# Patient Record
Sex: MALE | Race: WHITE | NOT HISPANIC OR LATINO | URBAN - METROPOLITAN AREA
[De-identification: names, ages, dates, MRNs, and addresses within clinical notes are randomized per-mention and may not be internally consistent; named-entity substitution may affect disease eponyms.]

---

## 2018-04-30 ENCOUNTER — OUTPATIENT (OUTPATIENT)
Dept: OUTPATIENT SERVICES | Facility: HOSPITAL | Age: 78
LOS: 1 days | Discharge: HOME | End: 2018-04-30

## 2018-04-30 DIAGNOSIS — Z13.1 ENCOUNTER FOR SCREENING FOR DIABETES MELLITUS: ICD-10-CM

## 2019-10-04 ENCOUNTER — OUTPATIENT (OUTPATIENT)
Dept: OUTPATIENT SERVICES | Facility: HOSPITAL | Age: 79
LOS: 1 days | Discharge: HOME | End: 2019-10-04

## 2019-10-04 DIAGNOSIS — R73.9 HYPERGLYCEMIA, UNSPECIFIED: ICD-10-CM

## 2021-04-13 ENCOUNTER — INPATIENT (INPATIENT)
Facility: HOSPITAL | Age: 81
LOS: 12 days | Discharge: HOME HEALTH | End: 2021-04-26
Attending: SURGERY | Admitting: SURGERY
Payer: COMMERCIAL

## 2021-04-13 VITALS
RESPIRATION RATE: 16 BRPM | DIASTOLIC BLOOD PRESSURE: 53 MMHG | TEMPERATURE: 100 F | OXYGEN SATURATION: 92 % | HEART RATE: 83 BPM | SYSTOLIC BLOOD PRESSURE: 112 MMHG

## 2021-04-13 LAB
ALBUMIN SERPL ELPH-MCNC: 3.6 G/DL — SIGNIFICANT CHANGE UP (ref 3.5–5.2)
ALP SERPL-CCNC: 90 U/L — SIGNIFICANT CHANGE UP (ref 30–115)
ALT FLD-CCNC: 11 U/L — SIGNIFICANT CHANGE UP (ref 0–41)
ANION GAP SERPL CALC-SCNC: 11 MMOL/L — SIGNIFICANT CHANGE UP (ref 7–14)
APPEARANCE UR: CLEAR — SIGNIFICANT CHANGE UP
APTT BLD: 31.2 SEC — SIGNIFICANT CHANGE UP (ref 27–39.2)
AST SERPL-CCNC: 14 U/L — SIGNIFICANT CHANGE UP (ref 0–41)
BACTERIA # UR AUTO: NEGATIVE — SIGNIFICANT CHANGE UP
BASE EXCESS BLDV CALC-SCNC: 1.6 MMOL/L — SIGNIFICANT CHANGE UP (ref -2–2)
BASOPHILS # BLD AUTO: 0.01 K/UL — SIGNIFICANT CHANGE UP (ref 0–0.2)
BASOPHILS NFR BLD AUTO: 0.1 % — SIGNIFICANT CHANGE UP (ref 0–1)
BILIRUB DIRECT SERPL-MCNC: 0.2 MG/DL — SIGNIFICANT CHANGE UP (ref 0–0.2)
BILIRUB INDIRECT FLD-MCNC: 0.2 MG/DL — SIGNIFICANT CHANGE UP (ref 0.2–1.2)
BILIRUB SERPL-MCNC: 0.4 MG/DL — SIGNIFICANT CHANGE UP (ref 0.2–1.2)
BILIRUB UR-MCNC: NEGATIVE — SIGNIFICANT CHANGE UP
BLD GP AB SCN SERPL QL: SIGNIFICANT CHANGE UP
BLD GP AB SCN SERPL QL: SIGNIFICANT CHANGE UP
BUN SERPL-MCNC: 26 MG/DL — HIGH (ref 10–20)
CA-I SERPL-SCNC: 1.17 MMOL/L — SIGNIFICANT CHANGE UP (ref 1.12–1.3)
CALCIUM SERPL-MCNC: 9.2 MG/DL — SIGNIFICANT CHANGE UP (ref 8.5–10.1)
CHLORIDE SERPL-SCNC: 97 MMOL/L — LOW (ref 98–110)
CO2 SERPL-SCNC: 23 MMOL/L — SIGNIFICANT CHANGE UP (ref 17–32)
COLOR SPEC: YELLOW — SIGNIFICANT CHANGE UP
CREAT SERPL-MCNC: 1.7 MG/DL — HIGH (ref 0.7–1.5)
DIFF PNL FLD: NEGATIVE — SIGNIFICANT CHANGE UP
EOSINOPHIL # BLD AUTO: 0 K/UL — SIGNIFICANT CHANGE UP (ref 0–0.7)
EOSINOPHIL NFR BLD AUTO: 0 % — SIGNIFICANT CHANGE UP (ref 0–8)
EPI CELLS # UR: 0 /HPF — SIGNIFICANT CHANGE UP (ref 0–5)
GAS PNL BLDV: 130 MMOL/L — LOW (ref 136–145)
GAS PNL BLDV: SIGNIFICANT CHANGE UP
GLUCOSE SERPL-MCNC: 122 MG/DL — HIGH (ref 70–99)
GLUCOSE UR QL: NEGATIVE — SIGNIFICANT CHANGE UP
HCO3 BLDV-SCNC: 26 MMOL/L — SIGNIFICANT CHANGE UP (ref 22–29)
HCT VFR BLD CALC: 20.7 % — LOW (ref 42–52)
HCT VFR BLDA CALC: 19 % — LOW (ref 34–44)
HGB BLD CALC-MCNC: 6.2 G/DL — LOW (ref 14–18)
HGB BLD-MCNC: 6.6 G/DL — CRITICAL LOW (ref 14–18)
HYALINE CASTS # UR AUTO: 1 /LPF — SIGNIFICANT CHANGE UP (ref 0–7)
IMM GRANULOCYTES NFR BLD AUTO: 0.6 % — HIGH (ref 0.1–0.3)
INR BLD: 1.33 RATIO — HIGH (ref 0.65–1.3)
KETONES UR-MCNC: NEGATIVE — SIGNIFICANT CHANGE UP
LACTATE BLDV-MCNC: 1.2 MMOL/L — SIGNIFICANT CHANGE UP (ref 0.5–1.6)
LEUKOCYTE ESTERASE UR-ACNC: ABNORMAL
LIDOCAIN IGE QN: 15 U/L — SIGNIFICANT CHANGE UP (ref 7–60)
LYMPHOCYTES # BLD AUTO: 0.29 K/UL — LOW (ref 1.2–3.4)
LYMPHOCYTES # BLD AUTO: 2.3 % — LOW (ref 20.5–51.1)
MCHC RBC-ENTMCNC: 29.9 PG — SIGNIFICANT CHANGE UP (ref 27–31)
MCHC RBC-ENTMCNC: 31.9 G/DL — LOW (ref 32–37)
MCV RBC AUTO: 93.7 FL — SIGNIFICANT CHANGE UP (ref 80–94)
MONOCYTES # BLD AUTO: 1.37 K/UL — HIGH (ref 0.1–0.6)
MONOCYTES NFR BLD AUTO: 10.9 % — HIGH (ref 1.7–9.3)
NEUTROPHILS # BLD AUTO: 10.82 K/UL — HIGH (ref 1.4–6.5)
NEUTROPHILS NFR BLD AUTO: 86.1 % — HIGH (ref 42.2–75.2)
NITRITE UR-MCNC: NEGATIVE — SIGNIFICANT CHANGE UP
NRBC # BLD: 0 /100 WBCS — SIGNIFICANT CHANGE UP (ref 0–0)
NT-PROBNP SERPL-SCNC: 368 PG/ML — HIGH (ref 0–300)
PCO2 BLDV: 40 MMHG — LOW (ref 42–55)
PH BLDV: 7.42 — SIGNIFICANT CHANGE UP (ref 7.26–7.43)
PH UR: 6 — SIGNIFICANT CHANGE UP (ref 5–8)
PLATELET # BLD AUTO: 297 K/UL — SIGNIFICANT CHANGE UP (ref 130–400)
PO2 BLDV: 25 MMHG — SIGNIFICANT CHANGE UP (ref 20–40)
POTASSIUM BLDV-SCNC: 3.8 MMOL/L — SIGNIFICANT CHANGE UP (ref 3.3–5.6)
POTASSIUM SERPL-MCNC: 4.3 MMOL/L — SIGNIFICANT CHANGE UP (ref 3.5–5)
POTASSIUM SERPL-SCNC: 4.3 MMOL/L — SIGNIFICANT CHANGE UP (ref 3.5–5)
PROT SERPL-MCNC: 5.7 G/DL — LOW (ref 6–8)
PROT UR-MCNC: SIGNIFICANT CHANGE UP
PROTHROM AB SERPL-ACNC: 15.3 SEC — HIGH (ref 9.95–12.87)
RBC # BLD: 2.21 M/UL — LOW (ref 4.7–6.1)
RBC # FLD: 20.3 % — HIGH (ref 11.5–14.5)
RBC CASTS # UR COMP ASSIST: 1 /HPF — SIGNIFICANT CHANGE UP (ref 0–4)
SAO2 % BLDV: 39 % — SIGNIFICANT CHANGE UP
SARS-COV-2 RNA SPEC QL NAA+PROBE: SIGNIFICANT CHANGE UP
SODIUM SERPL-SCNC: 131 MMOL/L — LOW (ref 135–146)
SP GR SPEC: 1.03 — HIGH (ref 1.01–1.03)
TROPONIN T SERPL-MCNC: <0.01 NG/ML — SIGNIFICANT CHANGE UP
UROBILINOGEN FLD QL: SIGNIFICANT CHANGE UP
WBC # BLD: 12.56 K/UL — HIGH (ref 4.8–10.8)
WBC # FLD AUTO: 12.56 K/UL — HIGH (ref 4.8–10.8)
WBC UR QL: 55 /HPF — HIGH (ref 0–5)

## 2021-04-13 PROCEDURE — 93010 ELECTROCARDIOGRAM REPORT: CPT

## 2021-04-13 PROCEDURE — 99285 EMERGENCY DEPT VISIT HI MDM: CPT | Mod: CS

## 2021-04-13 PROCEDURE — 74176 CT ABD & PELVIS W/O CONTRAST: CPT | Mod: 26,59,MH

## 2021-04-13 PROCEDURE — 71045 X-RAY EXAM CHEST 1 VIEW: CPT | Mod: 26,77

## 2021-04-13 PROCEDURE — 71045 X-RAY EXAM CHEST 1 VIEW: CPT | Mod: 26

## 2021-04-13 PROCEDURE — 74177 CT ABD & PELVIS W/CONTRAST: CPT | Mod: 26,MH

## 2021-04-13 RX ORDER — IOHEXOL 300 MG/ML
30 INJECTION, SOLUTION INTRAVENOUS ONCE
Refills: 0 | Status: COMPLETED | OUTPATIENT
Start: 2021-04-13 | End: 2021-04-13

## 2021-04-13 RX ORDER — SODIUM CHLORIDE 9 MG/ML
1000 INJECTION, SOLUTION INTRAVENOUS ONCE
Refills: 0 | Status: COMPLETED | OUTPATIENT
Start: 2021-04-13 | End: 2021-04-13

## 2021-04-13 RX ORDER — PANTOPRAZOLE SODIUM 20 MG/1
80 TABLET, DELAYED RELEASE ORAL ONCE
Refills: 0 | Status: COMPLETED | OUTPATIENT
Start: 2021-04-13 | End: 2021-04-13

## 2021-04-13 RX ORDER — PANTOPRAZOLE SODIUM 20 MG/1
8 TABLET, DELAYED RELEASE ORAL
Qty: 80 | Refills: 0 | Status: DISCONTINUED | OUTPATIENT
Start: 2021-04-13 | End: 2021-04-21

## 2021-04-13 RX ORDER — PIPERACILLIN AND TAZOBACTAM 4; .5 G/20ML; G/20ML
3.38 INJECTION, POWDER, LYOPHILIZED, FOR SOLUTION INTRAVENOUS ONCE
Refills: 0 | Status: COMPLETED | OUTPATIENT
Start: 2021-04-13 | End: 2021-04-13

## 2021-04-13 RX ORDER — SODIUM CHLORIDE 9 MG/ML
1000 INJECTION INTRAMUSCULAR; INTRAVENOUS; SUBCUTANEOUS
Refills: 0 | Status: DISCONTINUED | OUTPATIENT
Start: 2021-04-13 | End: 2021-04-14

## 2021-04-13 RX ORDER — ACETAMINOPHEN 500 MG
650 TABLET ORAL ONCE
Refills: 0 | Status: COMPLETED | OUTPATIENT
Start: 2021-04-13 | End: 2021-04-13

## 2021-04-13 RX ADMIN — SODIUM CHLORIDE 125 MILLILITER(S): 9 INJECTION INTRAMUSCULAR; INTRAVENOUS; SUBCUTANEOUS at 18:30

## 2021-04-13 RX ADMIN — SODIUM CHLORIDE 125 MILLILITER(S): 9 INJECTION INTRAMUSCULAR; INTRAVENOUS; SUBCUTANEOUS at 20:05

## 2021-04-13 RX ADMIN — SODIUM CHLORIDE 1000 MILLILITER(S): 9 INJECTION, SOLUTION INTRAVENOUS at 20:05

## 2021-04-13 RX ADMIN — Medication 650 MILLIGRAM(S): at 16:42

## 2021-04-13 RX ADMIN — PANTOPRAZOLE SODIUM 10 MG/HR: 20 TABLET, DELAYED RELEASE ORAL at 20:04

## 2021-04-13 RX ADMIN — PIPERACILLIN AND TAZOBACTAM 200 GRAM(S): 4; .5 INJECTION, POWDER, LYOPHILIZED, FOR SOLUTION INTRAVENOUS at 23:58

## 2021-04-13 RX ADMIN — PANTOPRAZOLE SODIUM 80 MILLIGRAM(S): 20 TABLET, DELAYED RELEASE ORAL at 20:04

## 2021-04-13 RX ADMIN — IOHEXOL 30 MILLILITER(S): 300 INJECTION, SOLUTION INTRAVENOUS at 21:29

## 2021-04-13 NOTE — ED PROVIDER NOTE - ATTENDING CONTRIBUTION TO CARE
79 y/o male with h/o cad s/p cabg, ckd, htn, hld, gerd, in ER with c/o abd pain x 2 days.  Pain to mid/R abdomen, no n/v.  no diarrhea.  Pt was told by his doctor that his blood counts have been going down (hgb was 10 10/2020, was 8.9 ~ 2 weeks ago), was started on iron 1 week ago, states stool are dark brown since starting iron, but denies any black or bloody stool.  no cp.  + harris and generalized weakness.  went to an [facility] today, had temp there and + guaiac.    PE - nad, nc/at, eomi, perrl, op - clear, mmm, cta b/l, no w/r/r, rrr, abd - soft, + diffuse tenderness, no guarding/rebound, nabs, rectal - brown stool, no blood, from x 4, A&O x 3, no focal neuro deficits.  -ivf, check labs, ct abd.

## 2021-04-13 NOTE — ED ADULT NURSE NOTE - CHIEF COMPLAINT QUOTE
Pt BIBA from urgent care complaining of abdominal pain x 1 week. sent in for blood on rectal exam at urgent care.

## 2021-04-13 NOTE — ED ADULT NURSE NOTE - ED STAT RN HANDOFF DETAILS
report given to kate de at this time ivf and iv protonix running ngt to [facility] cardiac monitoring maintained. will cont to monitor and assess.

## 2021-04-13 NOTE — ED ADULT NURSE NOTE - NSIMPLEMENTINTERV_GEN_ALL_ED
Implemented All Fall with Harm Risk Interventions:  [place] to call system. Call bell, personal items and telephone within reach. Instruct patient to call for assistance. Room bathroom lighting operational. Non-slip footwear when patient is off stretcher. Physically safe environment: no spills, clutter or unnecessary equipment. Stretcher in lowest position, wheels locked, appropriate side rails in place. Provide visual cue, wrist band, yellow gown, etc. Monitor gait and stability. Monitor for mental status changes and reorient to person, place, and time. Review medications for side effects contributing to fall risk. Reinforce activity limits and safety measures with patient and family. Provide visual clues: red socks.

## 2021-04-13 NOTE — ED PROVIDER NOTE - OBJECTIVE STATEMENT
80M with past medical history of CABGx4, HTN, HLD, CKD3, GERD presents with R sided abdominal pain, scant BRBPR generalized weakness, including inability to climb stairs, sent in by [facility] for +FOBT. PT denies fever, chills, chest pain, shortness of breath, nausea, vomiting, diarrhea, dysuria. 80M with past medical history of CABGx4, HTN, HLD, CKD3, GERD presents with R sided abdominal pain, scant BRBPR generalized weakness, including inability to climb stairs, sent in by [facility] for +FOBT. PT denies fever, chills, chest pain, shortness of breath, nausea, vomiting, diarrhea, dysuria. Most recent Hgb 8.9 on 3/29, per son, at which point pt started on iron pills.

## 2021-04-13 NOTE — ED PROVIDER NOTE - PROGRESS NOTE DETAILS
SC: ulceration with possible perforation on CT, surgery consulted. hgb 6.6 - prbc transfusion ordered.  ct abd - "Wall thickening and wall edema of the distal stomach and proximal duodenum with mucosal hyperenhancement and surrounding inflammatory change compatible with inflamed ulcer disease. An additional focus of air along the lesser curvature is slightly distant from the suspected main ulcer and may reflect additional ulcer disease, however contained or impending perforation is not excluded and close monitoring is suggested. No evidence for gross free air at this time."    Pt started on protonix drip.  surgery to see pt. GW: Repeat CT found contained perforation. admit to SICU for acute h/h drop and perforation pt given ivf, iv abx, on protonix drip, prbc transfusion, seen by surgery, NGT placed by surgical team,  repeat CT c/w contained perforation.  pt admitted to SICU. GW: Repeat CT found contained perforation. admit to SICU for acute h/h drop and contained perforation

## 2021-04-13 NOTE — ED PROVIDER NOTE - PHYSICAL EXAMINATION
CONSTITUTIONAL: in no acute distress.   SKIN: warm, dry  HEAD: Normocephalic.  EYES: PERRL.  ENT: Airway clear.  NECK: Supple.  LYMPH: No acute cervical adenopathy.  CARD: Regular rate and rhythm.   RESP: No wheezing, rales or rhonchi.  ABD: soft, TTP R abdomen upper and lower quadrants, no peritoneal signs, no CVA tenderness.  EXT: No clubbing, cyanosis.   NEURO: Alert, oriented.  PSYCH: Cooperative, appropriate. CONSTITUTIONAL: in no acute distress.   SKIN: warm, dry  HEAD: Normocephalic.  EYES: PERRL.  ENT: Airway clear.  NECK: Supple.  LYMPH: No acute cervical adenopathy.  CARD: Regular rate and rhythm.   RESP: No wheezing, rales or rhonchi.  ABD: soft, TTP R abdomen upper and lower quadrants, no peritoneal signs, no CVA tenderness.  EXT: No clubbing, cyanosis.   NEURO: Alert, oriented.  PSYCH: Cooperative, appropriate.  NESTOR: negative for gross blood or masses

## 2021-04-14 LAB
ANION GAP SERPL CALC-SCNC: 10 MMOL/L — SIGNIFICANT CHANGE UP (ref 7–14)
APTT BLD: 30.3 SEC — SIGNIFICANT CHANGE UP (ref 27–39.2)
BASOPHILS # BLD AUTO: 0.01 K/UL — SIGNIFICANT CHANGE UP (ref 0–0.2)
BASOPHILS NFR BLD AUTO: 0.1 % — SIGNIFICANT CHANGE UP (ref 0–1)
BUN SERPL-MCNC: 22 MG/DL — HIGH (ref 10–20)
CALCIUM SERPL-MCNC: 8.3 MG/DL — LOW (ref 8.5–10.1)
CHLORIDE SERPL-SCNC: 106 MMOL/L — SIGNIFICANT CHANGE UP (ref 98–110)
CK MB CFR SERPL CALC: 3.6 NG/ML — SIGNIFICANT CHANGE UP (ref 0.6–6.3)
CK MB CFR SERPL CALC: 4 NG/ML — SIGNIFICANT CHANGE UP (ref 0.6–6.3)
CK SERPL-CCNC: 141 U/L — SIGNIFICANT CHANGE UP (ref 0–225)
CO2 SERPL-SCNC: 21 MMOL/L — SIGNIFICANT CHANGE UP (ref 17–32)
CREAT SERPL-MCNC: 1.4 MG/DL — SIGNIFICANT CHANGE UP (ref 0.7–1.5)
EOSINOPHIL # BLD AUTO: 0.02 K/UL — SIGNIFICANT CHANGE UP (ref 0–0.7)
EOSINOPHIL NFR BLD AUTO: 0.2 % — SIGNIFICANT CHANGE UP (ref 0–8)
GLUCOSE SERPL-MCNC: 95 MG/DL — SIGNIFICANT CHANGE UP (ref 70–99)
HCT VFR BLD CALC: 24.7 % — LOW (ref 42–52)
HGB BLD-MCNC: 7.9 G/DL — LOW (ref 14–18)
IMM GRANULOCYTES NFR BLD AUTO: 0.5 % — HIGH (ref 0.1–0.3)
INR BLD: 1.23 RATIO — SIGNIFICANT CHANGE UP (ref 0.65–1.3)
LACTATE SERPL-SCNC: 1.3 MMOL/L — SIGNIFICANT CHANGE UP (ref 0.7–2)
LYMPHOCYTES # BLD AUTO: 0.3 K/UL — LOW (ref 1.2–3.4)
LYMPHOCYTES # BLD AUTO: 3.2 % — LOW (ref 20.5–51.1)
MAGNESIUM SERPL-MCNC: 1.8 MG/DL — SIGNIFICANT CHANGE UP (ref 1.8–2.4)
MCHC RBC-ENTMCNC: 29.5 PG — SIGNIFICANT CHANGE UP (ref 27–31)
MCHC RBC-ENTMCNC: 32 G/DL — SIGNIFICANT CHANGE UP (ref 32–37)
MCV RBC AUTO: 92.2 FL — SIGNIFICANT CHANGE UP (ref 80–94)
MONOCYTES # BLD AUTO: 1.02 K/UL — HIGH (ref 0.1–0.6)
MONOCYTES NFR BLD AUTO: 11 % — HIGH (ref 1.7–9.3)
NEUTROPHILS # BLD AUTO: 7.88 K/UL — HIGH (ref 1.4–6.5)
NEUTROPHILS NFR BLD AUTO: 85 % — HIGH (ref 42.2–75.2)
NRBC # BLD: 0 /100 WBCS — SIGNIFICANT CHANGE UP (ref 0–0)
PHOSPHATE SERPL-MCNC: 3.1 MG/DL — SIGNIFICANT CHANGE UP (ref 2.1–4.9)
PLATELET # BLD AUTO: 222 K/UL — SIGNIFICANT CHANGE UP (ref 130–400)
POTASSIUM SERPL-MCNC: 4.5 MMOL/L — SIGNIFICANT CHANGE UP (ref 3.5–5)
POTASSIUM SERPL-SCNC: 4.5 MMOL/L — SIGNIFICANT CHANGE UP (ref 3.5–5)
PROTHROM AB SERPL-ACNC: 14.1 SEC — HIGH (ref 9.95–12.87)
RBC # BLD: 2.68 M/UL — LOW (ref 4.7–6.1)
RBC # FLD: 19.4 % — HIGH (ref 11.5–14.5)
SODIUM SERPL-SCNC: 137 MMOL/L — SIGNIFICANT CHANGE UP (ref 135–146)
TROPONIN T SERPL-MCNC: <0.01 NG/ML — SIGNIFICANT CHANGE UP
TROPONIN T SERPL-MCNC: <0.01 NG/ML — SIGNIFICANT CHANGE UP
WBC # BLD: 9.28 K/UL — SIGNIFICANT CHANGE UP (ref 4.8–10.8)
WBC # FLD AUTO: 9.28 K/UL — SIGNIFICANT CHANGE UP (ref 4.8–10.8)

## 2021-04-14 PROCEDURE — 99291 CRITICAL CARE FIRST HOUR: CPT

## 2021-04-14 RX ORDER — METOPROLOL TARTRATE 50 MG
1 TABLET ORAL
Qty: 0 | Refills: 0 | DISCHARGE

## 2021-04-14 RX ORDER — METOPROLOL TARTRATE 50 MG
5 TABLET ORAL EVERY 6 HOURS
Refills: 0 | Status: DISCONTINUED | OUTPATIENT
Start: 2021-04-14 | End: 2021-04-14

## 2021-04-14 RX ORDER — METRONIDAZOLE 500 MG
TABLET ORAL
Refills: 0 | Status: DISCONTINUED | OUTPATIENT
Start: 2021-04-14 | End: 2021-04-17

## 2021-04-14 RX ORDER — CHLORHEXIDINE GLUCONATE 213 G/1000ML
1 SOLUTION TOPICAL
Refills: 0 | Status: DISCONTINUED | OUTPATIENT
Start: 2021-04-14 | End: 2021-04-26

## 2021-04-14 RX ORDER — MAGNESIUM SULFATE 500 MG/ML
1 VIAL (ML) INJECTION ONCE
Refills: 0 | Status: COMPLETED | OUTPATIENT
Start: 2021-04-14 | End: 2021-04-14

## 2021-04-14 RX ORDER — METOPROLOL TARTRATE 50 MG
5 TABLET ORAL EVERY 6 HOURS
Refills: 0 | Status: DISCONTINUED | OUTPATIENT
Start: 2021-04-14 | End: 2021-04-23

## 2021-04-14 RX ORDER — FLUCONAZOLE 150 MG/1
400 TABLET ORAL EVERY 24 HOURS
Refills: 0 | Status: DISCONTINUED | OUTPATIENT
Start: 2021-04-14 | End: 2021-04-14

## 2021-04-14 RX ORDER — METRONIDAZOLE 500 MG
500 TABLET ORAL EVERY 8 HOURS
Refills: 0 | Status: DISCONTINUED | OUTPATIENT
Start: 2021-04-14 | End: 2021-04-17

## 2021-04-14 RX ORDER — ATORVASTATIN CALCIUM 80 MG/1
80 TABLET, FILM COATED ORAL AT BEDTIME
Refills: 0 | Status: DISCONTINUED | OUTPATIENT
Start: 2021-04-14 | End: 2021-04-14

## 2021-04-14 RX ORDER — FLUCONAZOLE 150 MG/1
800 TABLET ORAL ONCE
Refills: 0 | Status: DISCONTINUED | OUTPATIENT
Start: 2021-04-14 | End: 2021-04-14

## 2021-04-14 RX ORDER — ASPIRIN/CALCIUM CARB/MAGNESIUM 324 MG
1 TABLET ORAL
Qty: 0 | Refills: 0 | DISCHARGE

## 2021-04-14 RX ORDER — BENZOCAINE 10 %
1 GEL (GRAM) MUCOUS MEMBRANE
Refills: 0 | Status: DISCONTINUED | OUTPATIENT
Start: 2021-04-14 | End: 2021-04-26

## 2021-04-14 RX ORDER — OMEPRAZOLE 10 MG/1
1 CAPSULE, DELAYED RELEASE ORAL
Qty: 0 | Refills: 0 | DISCHARGE

## 2021-04-14 RX ORDER — SODIUM CHLORIDE 9 MG/ML
1000 INJECTION INTRAMUSCULAR; INTRAVENOUS; SUBCUTANEOUS
Refills: 0 | Status: DISCONTINUED | OUTPATIENT
Start: 2021-04-14 | End: 2021-04-15

## 2021-04-14 RX ORDER — RANOLAZINE 500 MG/1
1 TABLET, FILM COATED, EXTENDED RELEASE ORAL
Qty: 0 | Refills: 0 | DISCHARGE

## 2021-04-14 RX ORDER — METOPROLOL TARTRATE 50 MG
50 TABLET ORAL
Refills: 0 | Status: DISCONTINUED | OUTPATIENT
Start: 2021-04-14 | End: 2021-04-14

## 2021-04-14 RX ORDER — FLUCONAZOLE 150 MG/1
200 TABLET ORAL EVERY 24 HOURS
Refills: 0 | Status: DISCONTINUED | OUTPATIENT
Start: 2021-04-14 | End: 2021-04-17

## 2021-04-14 RX ORDER — CHOLECALCIFEROL (VITAMIN D3) 125 MCG
1 CAPSULE ORAL
Qty: 0 | Refills: 0 | DISCHARGE

## 2021-04-14 RX ORDER — ATORVASTATIN CALCIUM 80 MG/1
1 TABLET, FILM COATED ORAL
Qty: 0 | Refills: 0 | DISCHARGE

## 2021-04-14 RX ORDER — IRON POLYSACCHARIDE COMPLEX 150 MG
1 CAPSULE ORAL
Qty: 0 | Refills: 0 | DISCHARGE

## 2021-04-14 RX ORDER — CEFEPIME 1 G/1
1000 INJECTION, POWDER, FOR SOLUTION INTRAMUSCULAR; INTRAVENOUS EVERY 12 HOURS
Refills: 0 | Status: DISCONTINUED | OUTPATIENT
Start: 2021-04-14 | End: 2021-04-17

## 2021-04-14 RX ORDER — METOPROLOL TARTRATE 50 MG
25 TABLET ORAL EVERY 12 HOURS
Refills: 0 | Status: DISCONTINUED | OUTPATIENT
Start: 2021-04-14 | End: 2021-04-14

## 2021-04-14 RX ORDER — ASPIRIN/CALCIUM CARB/MAGNESIUM 324 MG
81 TABLET ORAL DAILY
Refills: 0 | Status: DISCONTINUED | OUTPATIENT
Start: 2021-04-14 | End: 2021-04-14

## 2021-04-14 RX ORDER — METRONIDAZOLE 500 MG
500 TABLET ORAL ONCE
Refills: 0 | Status: COMPLETED | OUTPATIENT
Start: 2021-04-14 | End: 2021-04-14

## 2021-04-14 RX ADMIN — Medication 100 MILLIGRAM(S): at 21:16

## 2021-04-14 RX ADMIN — Medication 5 MILLIGRAM(S): at 18:11

## 2021-04-14 RX ADMIN — Medication 5 MILLIGRAM(S): at 06:03

## 2021-04-14 RX ADMIN — FLUCONAZOLE 100 MILLIGRAM(S): 150 TABLET ORAL at 06:03

## 2021-04-14 RX ADMIN — Medication 100 MILLIGRAM(S): at 02:59

## 2021-04-14 RX ADMIN — Medication 100 MILLIGRAM(S): at 13:16

## 2021-04-14 RX ADMIN — CEFEPIME 100 MILLIGRAM(S): 1 INJECTION, POWDER, FOR SOLUTION INTRAMUSCULAR; INTRAVENOUS at 06:02

## 2021-04-14 RX ADMIN — SODIUM CHLORIDE 100 MILLILITER(S): 9 INJECTION INTRAMUSCULAR; INTRAVENOUS; SUBCUTANEOUS at 02:59

## 2021-04-14 RX ADMIN — CEFEPIME 100 MILLIGRAM(S): 1 INJECTION, POWDER, FOR SOLUTION INTRAMUSCULAR; INTRAVENOUS at 17:33

## 2021-04-14 RX ADMIN — Medication 25 MILLIGRAM(S): at 09:17

## 2021-04-14 RX ADMIN — Medication 50 GRAM(S): at 11:57

## 2021-04-14 NOTE — H&P ADULT - NSHPLABSRESULTS_GEN_ALL_CORE
LAB/STUDIES:                        6.6    12.56 )-----------( 297      ( 2021 15:56 )             20.7     04-13    131<L>  |  97<L>  |  26<H>  ----------------------------<  122<H>  4.3   |  23  |  1.7<H>    Ca    9.2      2021 15:56    TPro  5.7<L>  /  Alb  3.6  /  TBili  0.4  /  DBili  0.2  /  AST  14  /  ALT  11  /  AlkPhos  90  04-13    PT/INR - ( 2021 15:56 )   PT: 15.30 sec;   INR: 1.33 ratio         PTT - ( 2021 15:56 )  PTT:31.2 sec  LIVER FUNCTIONS - ( 2021 15:56 )  Alb: 3.6 g/dL / Pro: 5.7 g/dL / ALK PHOS: 90 U/L / ALT: 11 U/L / AST: 14 U/L / GGT: x           Urinalysis Basic - ( 2021 20:16 )    Color: Yellow / Appearance: Clear / S.033 / pH: x  Gluc: x / Ketone: Negative  / Bili: Negative / Urobili: <2 mg/dL   Blood: x / Protein: Trace / Nitrite: Negative   Leuk Esterase: Large / RBC: 1 /HPF / WBC 55 /HPF   Sq Epi: x / Non Sq Epi: 0 /HPF / Bacteria: Negative      CARDIAC MARKERS ( 2021 15:56 )  x     / <0.01 ng/mL / x     / x     / x                  IMAGING:  < from: CT Abdomen and Pelvis w/ IV Cont (21 @ 17:57) >      Wall thickening and wall edema of the distal stomach and proximal duodenum with mucosal hyperenhancement and surrounding inflammatory change compatible with inflamed ulcer disease. An additional focus of air along the lesser curvature is slightly distant from the suspected main ulcer and may reflect additional ulcer disease, however contained or impending perforation is not excluded and close monitoring is suggested. No evidence for gross free air at this time.    A 6.4 cm left renal exophytic lesion is not characterized on this study. Recommend nonemergent renal protocol CT for further evaluation.    < end of copied text >    < from: CT Abdomen and Pelvis w/ Oral Cont (21 @ 22:09) >    Thickening of the gastric wall in the region of the greater curvature as well as the region of the antrum, and thickening of the duodenal bulb and second portion of the duodenum are again noted. These findings are associated with stranding in the adjacent fat. Findings are consistent with gastritis and duodenitis. Despite the degree of thickening of the gastric and duodenal wall, the oral contrast material has passed into the proximal small bowel.    Previously noted small gas bubbles are again seen posterior to the posterior wall of the gastric antrum (3/25-26). Findings are consistent with a contained perforated ulcer into the region of the lesser sac with inflammatory changes but without a discrete fluid collection. There is no evidence of active extravasation of the oral contrast material. There is no evidence of pneumoperitoneum or free air elsewhere within the abdomen.    < end of copied text >

## 2021-04-14 NOTE — CONSULT NOTE ADULT - SUBJECTIVE AND OBJECTIVE BOX
SICU Consultation Note  ======================================================================================================  KELLY DELGADO  MRN-[id number]    80M w/ PMH of CKD 3 (baseline Cr ~1.7), HTN, HLD< GERD, known L kidney mass, pacemaker (non-functioning per pt) and CAD s/p 4-vessel CABG ([facility], ) who presented to the ED with 2 weeks of worsening abdominal pain. Pt reports abdominal pain was right sided. Over the past week, he developed worsening dyspnea. At baseline, pt walks on treadmill 5x a week. Pt denies history of prior EGD or cscope. Pt was noted to be anemic to 8.9 outpatient with subsequent FOBT positive. He was supposed to FU with GI, but never did.     In the ED, Hb 6.6, WBC 12. CT A/P notable for wall thickening and wall edema of the distal stomach and proximal duodenum with mucosal hyperenhancement and surrounding inflammatory change compatible with inflamed ulcer disease, as well as a focus of air along the lesser curvature slightly distant from the suspected main ulcer possibly reflecting additional ulcer disease, however contained or impending perforation is not excluded and close monitoring is suggested. No gross free air noted. NGT was placed to 50cm. PO contrast given, and repeat CT showed redemonstration of gastric wall thickening, thickening of the duodenal bulb and 2nd portion of the duodenum with associated fat stranding c/w gastritis and duodenitis. PO contrast was visible in the proximal small bowel. Previously noted small gas bubbles are again seen posterior to the posterior wall of the gastric antrum. Findings are consistent with a contained perforated ulcer into the region of the lesser sac with inflammatory changes but without a discrete fluid collection. There is no evidence of active extravasation of the oral contrast material. There is no evidence of pneumoperitoneum or free air elsewhere within the abdomen.     SICU is consulted for hemodynamic monitoring i/s/o contained gastric ulcer perforation.     PMH  CAD (coronary artery disease)  GERD (gastroesophageal reflux disease)    Home Meds:  aspirin 81  omeprazole (non-compliant)  atorvastatin  metoprolol    Allergies  No Known Allergies    Current Medications:  pantoprazole Infusion 8 mG/Hr (10 mL/Hr) IV Continuous <Continuous>  sodium chloride 0.9%. 1000 milliLiter(s) (125 mL/Hr) IV Continuous <Continuous>    Advanced Directives: Presumed Full Code    VITAL SIGNS, INS/OUTS (Last 24hours):    ICU Vital Signs Last 24 Hrs  T(C): 36.7 (2021 21:25), Max: 37.5 (2021 14:44)  T(F): 98 (2021 21:25), Max: 99.5 (2021 14:44)  HR: 77 (:) (72 - 90)  BP: 119/60 (:25) (112/53 - 145/45)  RR: 16 (2021 14:44) (16 - 16)  SpO2: 92% (2021 14:44) (92% - 92%)    Physical Exam:   ---------------------------------------------------------------------------------------  GCS:      Exam: A&Ox3, no focal deficits    RESPIRATORY:  Normal expansion/effort  Mechanical Ventilation:     CARDIOVASCULAR:  S1/S2.  RRR  No peripheral edema    GASTROINTESTINAL:  Abdomen soft, non-tender, non-distended    MUSCULOSKELETAL:  Extremities warm, pink, well-perfused.    DERM:  No skin breakdown     :   Exam: Taveras catheter in place.       Tubes/Lines/Drains  ----------------------------------------------------------------------------------------------------------  [x] Peripheral IV  [x] NGT    LABS  --------------------------------------------------------------------------------------               6.6    12.56 )-----------( 297      ( 2021 15:56 )             20.7       Auto Neutrophil %: 86.1 % (21 @ 15:56)  Auto Immature Granulocyte %: 0.6 % (21 @ 15:56)        131<L>  |  97<L>  |  26<H>  ----------------------------<  122<H>  4.3   |  23  |  1.7<H>    Calcium, Total Serum: 9.2 mg/dL (21 @ 15:56)    LFTs:             5.7  | 0.4  | 14       ------------------[90      ( 2021 15:56 )  3.6  | 0.2  | 11          Lipase:15       Blood Gas Venous - Lactate: 1.2 mmoL/L (21 @ 15:58)    Coags:     15.30  ----< 1.33    ( 2021 15:56 )     31.2      CARDIAC MARKERS ( 2021 15:56 )  x     / <0.01 ng/mL / x     / x     / x        Serum Pro-Brain Natriuretic Peptide: 368 pg/mL (21 @ 15:56)    Urinalysis Basic - ( 2021 20:16 )    Color: Yellow / Appearance: Clear / S.033 / pH: x  Gluc: x / Ketone: Negative  / Bili: Negative / Urobili: <2 mg/dL   Blood: x / Protein: Trace / Nitrite: Negative   Leuk Esterase: Large / RBC: 1 /HPF / WBC 55 /HPF   Sq Epi: x / Non Sq Epi: 0 /HPF / Bacteria: Negative    CT/XRAY/ECHO/TCD/EEG  ----------------------------------------------------------------------------------------------  < from: CT Abdomen and Pelvis w/ IV Cont (21 @ 17:57) >    FINDINGS:  Motion degraded examination.    LOWER CHEST: Bibasilar subsegmental atelectasis/scarring. Partially imaged sternotomy, pacer wire and coronary artery calcifications.    LIVER: Scattered hepatic cysts and hypodensities too small to characterize.    PANCREAS: Unremarkable. Inflammation in the region the pancreatic head is favored to be secondary to the stomach    GALLBLADDER AND BILIARY TREE: Unremarkable CT appearance of the gallbladder. No biliary ductal dilatation.    KIDNEYS: Symmetric pattern of renal enhancement. No hydronephrosis. Bilateral renal cysts. A 6.4 cm left renal exophytic hypodensity measures higher attenuation than expected for a simple cyst and has a questionable perceptible rim.    VASCULATURE: Normal caliber abdominal aorta with atherosclerotic changes.    BOWEL: No bowel obstruction. Underdistention limits evaluation for colonic wall thickening. Normal appendix. Mild wall thickening of the distal esophagus. There is marked wall thickening and wall edema of the distal stomach and proximal duodenum with mucosal hyperenhancement and focal outpouching compatible with inflamed ulcer disease. A focus of air along the lesser curvature (series 4, image 93) is slightly distal from the suspected main ulcer.    PERITONEUM/RETROPERITONEUM/MESENTERY: No ascites or gross pneumoperitoneum    PELVIC VISCERA: Underdistention limits evaluation of the urinary bladder. Enlarged prostate gland. Bilateral hydroceles.    IMPRESSION:  ·	Wall thickening and wall edema of the distal stomach and proximal duodenum with mucosal hyperenhancement and surrounding inflammatory change compatible with inflamed ulcer disease. An additional focus of air along the lesser curvature is slightly distant from the suspected main ulcer and may reflect additional ulcer disease, however contained or impending perforation is not excluded and close monitoring is suggested. No evidence for gross free air at this time.  ·	A 6.4 cm left renal exophytic lesion is not characterized on this study. Recommend nonemergent renal protocol CT for further evaluation.      < from: CT Abdomen and Pelvis w/ Oral Cont (21 @ 22:09) >    FINDINGS:  TUBES/LINES: Pacemaker lead is noted. An NG tube is in place extending to just distal to the EG junction.    PANCREAS: The pancreas is normal in size and configuration. No evidence of mass or pancreatitis. As described below, inflammatory changes are noted in the region of the lesser sac.    PELVIC ORGANS: Prostatic enlargement again noted. Contrast is present within the urinary bladderfrom the previous study. No evidence of pelvic lymphadenopathy or fluid collection.    PERITONEUM/MESENTERY/BOWEL: Thickening of the gastric wall in the region of the greater curvature as well as the region of the antrum, and thickening of the duodenal bulb and second portion of the duodenum are again noted. These findings are associated with stranding in the adjacent fat. Findings are consistent with gastritis and duodenitis. Despite the degree of thickening of the gastric and duodenal wall, theoral contrast material is passed into the proximal small bowel.    Previously noted small gas bubbles are again seen posterior to the posterior wall of the gastric antrum. Findings are consistent with a contained perforated ulcer into the region of the lesser sac with inflammatory changes but without a discrete fluid collection. There is no evidence of pneumoperitoneum or free air elsewhere within the abdomen.    No evidence of ascites within the abdomen or pelvis. The appendix is normal in appearance.    IMPRESSION:  ·	Thickening of the gastric wall in the region of the greater curvature as well as the region of the antrum, and thickening of the duodenal bulb and second portion of the duodenum are again noted. These findings are associated with stranding in the adjacent fat. Findings are consistent with gastritis and duodenitis. Despite the degree of thickening of the gastric and duodenal wall, the oral contrast material has passed into the proximal small bowel.  ·	Previously noted small gas bubbles are again seen posterior to the posterior wall of the gastric antrum (3/25-26). Findings are consistent with a contained perforated ulcer into the region of the lesser sac with inflammatory changes but without a discrete fluid collection. There is no evidence of active extravasation of the oral contrast material. There is no evidence of pneumoperitoneum or free air elsewhere within the abdomen.  --------------------------------------------------------------------------------------  Admit Diagnosis:       SICU Consultation Note  ======================================================================================================  KELLY DELGADO  MRN-[id number]    80M w/ PMH of CKD 3 (baseline Cr ~1.7), HTN, HLD, GERD, known L kidney mass, pacemaker (non-functioning per pt) and CAD s/p 4-vessel CABG ([facility], ) who presented to the ED with 2 weeks of worsening abdominal pain. Denies inciting factor, mostly right sided and intermittent. Yesterday developed dyspnea and fatigue, and presented to an outpatient urgent care where FOBT was performed and positive. Pt was referred to come into the ED. At baseline, pt walks on treadmill for 30 minutes 2-5x a week. No prior history of EGD or cscope. Of note, pt was noted to be anemic to 8.9 on outpatient labs and started on iron BID by his nephrologist. He was supposed to see GI in late April for EGD/c-scope.     In the ED, Hb 6.6, WBC 12. CT A/P notable for wall thickening and wall edema of the distal stomach and proximal duodenum with mucosal hyperenhancement and surrounding inflammatory change compatible with inflamed ulcer disease, as well as a focus of air along the lesser curvature slightly distant from the suspected main ulcer possibly reflecting additional ulcer disease, however contained or impending perforation is not excluded and close monitoring is suggested. No gross free air noted. NGT was placed to 50cm. PO contrast given, and repeat CT showed redemonstration of gastric wall thickening, thickening of the duodenal bulb and 2nd portion of the duodenum with associated fat stranding c/w gastritis and duodenitis. PO contrast was visible in the proximal small bowel. Previously noted small gas bubbles are again seen posterior to the posterior wall of the gastric antrum. Findings are consistent with a contained perforated ulcer into the region of the lesser sac with inflammatory changes but without a discrete fluid collection. There is no evidence of active extravasation of the oral contrast material. There is no evidence of pneumoperitoneum or free air elsewhere within the abdomen.     SICU is consulted for hemodynamic monitoring i/s/o contained gastric ulcer perforation.     PMH  CAD (coronary artery disease)  GERD (gastroesophageal reflux disease)    Home Meds:  aspirin 81  atorvastatin 80 QD  metoprolol 25 BID  vitamin D3 2000 BID  iron 65 BID  ranolazine 500 BID    Allergies  No Known Allergies    Current Medications:  pantoprazole Infusion 8 mG/Hr (10 mL/Hr) IV Continuous <Continuous>  sodium chloride 0.9%. 1000 milliLiter(s) (125 mL/Hr) IV Continuous <Continuous>    Advanced Directives: Presumed Full Code    VITAL SIGNS, INS/OUTS (Last 24hours):    ICU Vital Signs Last 24 Hrs  T(C): 36.7 (2021 21:25), Max: 37.5 (2021 14:44)  T(F): 98 (2021 21:25), Max: 99.5 (2021 14:44)  HR: 77 (:) (72 - 90)  BP: 119/60 (2021 21:25) (112/53 - 145/45)  RR: 16 (2021 14:44) (16 - 16)  SpO2: 92% (2021 14:44) (92% - 92%)    Physical Exam:   ---------------------------------------------------------------------------------------  GCS: 15     Exam: A&Ox3, no focal deficits    RESPIRATORY:  Normal expansion/effort    CARDIOVASCULAR:  S1/S2.  RRR    GASTROINTESTINAL:  Abdomen soft, non-tender, non-distended    MUSCULOSKELETAL:  Extremities warm, pink, well-perfused    DERM:  No skin breakdown     Tubes/Lines/Drains  ----------------------------------------------------------------------------------------------------------  [x] Peripheral IV  [x] NGT    LABS  --------------------------------------------------------------------------------------               6.6    12.56 )-----------( 297      ( 2021 15:56 )             20.7       Auto Neutrophil %: 86.1 % (21 @ 15:56)  Auto Immature Granulocyte %: 0.6 % (21 @ 15:56)        131<L>  |  97<L>  |  26<H>  ----------------------------<  122<H>  4.3   |  23  |  1.7<H>    Calcium, Total Serum: 9.2 mg/dL (21 @ 15:56)    LFTs:             5.7  | 0.4  | 14       ------------------[90      ( 2021 15:56 )  3.6  | 0.2  | 11          Lipase:15       Blood Gas Venous - Lactate: 1.2 mmoL/L (21 @ 15:58)    Coags:     15.30  ----< 1.33    ( 2021 15:56 )     31.2      CARDIAC MARKERS ( 2021 15:56 )  x     / <0.01 ng/mL / x     / x     / x        Serum Pro-Brain Natriuretic Peptide: 368 pg/mL (21 @ 15:56)    Urinalysis Basic - ( 2021 20:16 )    Color: Yellow / Appearance: Clear / S.033 / pH: x  Gluc: x / Ketone: Negative  / Bili: Negative / Urobili: <2 mg/dL   Blood: x / Protein: Trace / Nitrite: Negative   Leuk Esterase: Large / RBC: 1 /HPF / WBC 55 /HPF   Sq Epi: x / Non Sq Epi: 0 /HPF / Bacteria: Negative    CT/XRAY/ECHO/TCD/EEG  ----------------------------------------------------------------------------------------------  < from: CT Abdomen and Pelvis w/ IV Cont (21 @ 17:57) >    FINDINGS:  Motion degraded examination.    LOWER CHEST: Bibasilar subsegmental atelectasis/scarring. Partially imaged sternotomy, pacer wire and coronary artery calcifications.    LIVER: Scattered hepatic cysts and hypodensities too small to characterize.    PANCREAS: Unremarkable. Inflammation in the region the pancreatic head is favored to be secondary to the stomach    GALLBLADDER AND BILIARY TREE: Unremarkable CT appearance of the gallbladder. No biliary ductal dilatation.    KIDNEYS: Symmetric pattern of renal enhancement. No hydronephrosis. Bilateral renal cysts. A 6.4 cm left renal exophytic hypodensity measures higher attenuation than expected for a simple cyst and has a questionable perceptible rim.    VASCULATURE: Normal caliber abdominal aorta with atherosclerotic changes.    BOWEL: No bowel obstruction. Underdistention limits evaluation for colonic wall thickening. Normal appendix. Mild wall thickening of the distal esophagus. There is marked wall thickening and wall edema of the distal stomach and proximal duodenum with mucosal hyperenhancement and focal outpouching compatible with inflamed ulcer disease. A focus of air along the lesser curvature (series 4, image 93) is slightly distal from the suspected main ulcer.    PERITONEUM/RETROPERITONEUM/MESENTERY: No ascites or gross pneumoperitoneum    PELVIC VISCERA: Underdistention limits evaluation of the urinary bladder. Enlarged prostate gland. Bilateral hydroceles.    IMPRESSION:  ·	Wall thickening and wall edema of the distal stomach and proximal duodenum with mucosal hyperenhancement and surrounding inflammatory change compatible with inflamed ulcer disease. An additional focus of air along the lesser curvature is slightly distant from the suspected main ulcer and may reflect additional ulcer disease, however contained or impending perforation is not excluded and close monitoring is suggested. No evidence for gross free air at this time.  ·	A 6.4 cm left renal exophytic lesion is not characterized on this study. Recommend nonemergent renal protocol CT for further evaluation.      < from: CT Abdomen and Pelvis w/ Oral Cont (21 @ 22:09) >    FINDINGS:  TUBES/LINES: Pacemaker lead is noted. An NG tube is in place extending to just distal to the EG junction.    PANCREAS: The pancreas is normal in size and configuration. No evidence of mass or pancreatitis. As described below, inflammatory changes are noted in the region of the lesser sac.    PELVIC ORGANS: Prostatic enlargement again noted. Contrast is present within the urinary bladderfrom the previous study. No evidence of pelvic lymphadenopathy or fluid collection.    PERITONEUM/MESENTERY/BOWEL: Thickening of the gastric wall in the region of the greater curvature as well as the region of the antrum, and thickening of the duodenal bulb and second portion of the duodenum are again noted. These findings are associated with stranding in the adjacent fat. Findings are consistent with gastritis and duodenitis. Despite the degree of thickening of the gastric and duodenal wall, theoral contrast material is passed into the proximal small bowel.    Previously noted small gas bubbles are again seen posterior to the posterior wall of the gastric antrum. Findings are consistent with a contained perforated ulcer into the region of the lesser sac with inflammatory changes but without a discrete fluid collection. There is no evidence of pneumoperitoneum or free air elsewhere within the abdomen.    No evidence of ascites within the abdomen or pelvis. The appendix is normal in appearance.    IMPRESSION:  ·	Thickening of the gastric wall in the region of the greater curvature as well as the region of the antrum, and thickening of the duodenal bulb and second portion of the duodenum are again noted. These findings are associated with stranding in the adjacent fat. Findings are consistent with gastritis and duodenitis. Despite the degree of thickening of the gastric and duodenal wall, the oral contrast material has passed into the proximal small bowel.  ·	Previously noted small gas bubbles are again seen posterior to the posterior wall of the gastric antrum (3/25-26). Findings are consistent with a contained perforated ulcer into the region of the lesser sac with inflammatory changes but without a discrete fluid collection. There is no evidence of active extravasation of the oral contrast material. There is no evidence of pneumoperitoneum or free air elsewhere within the abdomen.  --------------------------------------------------------------------------------------  Admit Diagnosis:

## 2021-04-14 NOTE — CONSULT NOTE ADULT - SUBJECTIVE AND OBJECTIVE BOX
Gastroenterology/Hepatology Consultation    For questions and inquiries please page [phone].  For urgent matters or after 5pm and on weekends please page the fellow on call through the GI paging system.    80y Male with Gastric ulcers- concerns for perforation and UGIB  Patient is a 80y old  Male who presents with a chief complaint of contained perforated gastric ulcer (14 Apr 2021 00:12)      HPI:  81yo M, PMHx GERD, HTN, CAD, HLD, CKD3, CABGx4, presents for abdominal pain of a few days duration. Pain is described as burning intermittent also endorses heart burn and has been on antacids and PPIs for a few weeks. Patient has chronic anemia which is likely in part due to his known kidney disease on top of his other chronic conditions. However per his account, his kidney doc has recently started him on Iron for FELICITY and he was referred for OP GI for endoscopic evaluation for his FELICITY. He s never had endoscopy. Denies family Hx of gastric CA and CRC. Denies hematochezia or melena though states his stool has been dark brown since he was started on Iron. On ASA but denies NSAID use otherwise  Denies CP Denies DIAZ, SOB.    PAST MEDICAL & SURGICAL HISTORY:  CAD (coronary artery disease)    GERD (gastroesophageal reflux disease)     (14 Apr 2021 00:10)      GI Hx:    Previous colonoscopy(ies):  Previous EGD(s):        Review of system  General:  (-) weight loss, (-) fevers  Eyes:  (-) visual changes  CV:  (-) chest pain  Resp: (-) SOB, (-) wheezing  GI: (-) abdominal pain,  (-) nausea, (-) vomiting, (-) dysphagia, (-) diarrhea, (-) constipation, (-) rectal bleeding, (-) melena, (-) hematemesis.  Neuro: (-) confusion, (-) weakness  Psych:  (-) Hallucinations  Heme:  (-) easy bruisability    Past medical/surgical Hx:  PAST MEDICAL & SURGICAL HISTORY:  CAD (coronary artery disease)    GERD (gastroesophageal reflux disease)      Home Medications:  aspirin 81 mg oral tablet, chewable: 1 tab(s) orally once a day  atorvastatin 80 mg oral tablet: 1 tab(s) orally once a day  iron polysaccharide (as elemental iron) 60 mg oral capsule: 1 tab(s) orally 2 times a day  metoprolol succinate 25 mg oral tablet, extended release: 1 tab(s) orally 2 times a day  ranolazine 500 mg oral tablet, extended release: 1 tab(s) orally 2 times a day  Vitamin D3 2000 intl units (50 mcg) oral tablet: 1 tab(s) orally 2 times a day      Allergies: No Known Allergies      Current Medications:   cefepime   IVPB 1000 milliGRAM(s) IV Intermittent every 12 hours  chlorhexidine 4% Liquid 1 Application(s) Topical <User Schedule>  fluconAZOLE IVPB 200 milliGRAM(s) IV Intermittent every 24 hours  metoprolol tartrate Injectable 5 milliGRAM(s) IV Push every 6 hours  metroNIDAZOLE  IVPB 500 milliGRAM(s) IV Intermittent every 8 hours  metroNIDAZOLE  IVPB      pantoprazole Infusion 8 mG/Hr IV Continuous <Continuous>  sodium chloride 0.9%. 1000 milliLiter(s) IV Continuous <Continuous>      Physical exam:  T(C): 35.9 (04-14-21 @ 16:05), Max: 36.9 (04-14-21 @ 07:18)  HR: 73 (04-14-21 @ 18:35) (68 - 82)  BP: 154/67 (04-14-21 @ 18:35) (119/60 - 193/75)  RR: 20 (04-14-21 @ 18:35) (17 - 20)  SpO2: 100% (04-14-21 @ 18:35) (99% - 100%)  GENERAL: NAD  HEAD:  Atraumatic, Normocephalic  EYES: Sclera:NL  NECK: Supple, no JVD or thyromegaly  CHEST/LUNG: Good bilateral air entry  HEART: normal S1, S2. Regular  ABDOMEN: (-) distended, (-) tender, (-) rebound, (+) BS, (-)HSM  EXTREMITIES: (-) edema  NEUROLOGY: (-) asterixis  SKIN: (-) jaundice      Data:                        7.9    9.28  )-----------( 222      ( 14 Apr 2021 09:11 )             24.7     MCV 92.2 (04-14-21)    RDW 19.4 (04-14-21)    HGB trend:  7.9  04-14-21 @ 09:11  6.6  04-13-21 @ 15:56        04-14    137  |  106  |  22<H>  ----------------------------<  95  4.5   |  21  |  1.4    Ca    8.3<L>      14 Apr 2021 09:11  Phos  3.1     04-14  Mg     1.8     04-14    TPro  5.7<L>  /  Alb  3.6  /  TBili  0.4  /  DBili  0.2  /  AST  14  /  ALT  11  /  AlkPhos  90  04-13    Liver panel trend:  TBili 0.4   /   AST 14   /   ALT 11   /   AlkP 90   /   Tptn 5.7   /   Alb 3.6    /   DBili 0.2      04-13        PT/INR - ( 14 Apr 2021 09:11 )   PT: 14.10 sec;   INR: 1.23 ratio         PTT - ( 14 Apr 2021 09:11 )  PTT:30.3 sec    < from: CT Abdomen and Pelvis w/ Oral Cont (04.13.21 @ 22:09) >  INTERPRETATION:  CLINICAL STATEMENT: Abdominal pain; possible contained perf, f/u ct with po contrast    TECHNIQUE: Contiguous axial CT images were obtained from the lower chest to the pubic symphysis with oral contrast and without intravenous contrast.  Reformatted images in the coronal and sagittal planes were acquired.    COMPARISON CT: CT scan of the abdomen and pelvis dated 4/13/2021 5:53 PM    OTHER STUDIES USED FOR CORRELATION: None.    FINDINGS:    TUBES/LINES: Pacemaker lead is noted. An NG tube is in place extending to just distal to the EG junction.    LOWER CHEST: Status post post sternotomy. There are mild atelectatic changes at the lung bases. Nopleural or pericardial effusion.    HEPATIC: Unchanged.    BILIARY: No calcified gallstones are noted.    SPLEEN: Unremarkable.    PANCREAS: The pancreas is normal in size and configuration. No evidence of mass or pancreatitis. As described below, inflammatory changes are noted in the region of the lesser sac.    ADRENAL GLANDS: Unchanged    KIDNEYS: Unchanged    ABDOMINOPELVIC NODES: Unremarkable.    PELVIC ORGANS: Prostatic enlargement again noted. Contrast is present within the urinary bladderfrom the previous study. No evidence of pelvic lymphadenopathy or fluid collection.    PERITONEUM/MESENTERY/BOWEL: Thickening of the gastric wall in the region of the greater curvature as well as the region of the antrum, and thickening of the duodenal bulb and second portion of the duodenum are again noted. These findings are associated with stranding in the adjacent fat. Findings are consistent with gastritis and duodenitis. Despite the degree of thickening of the gastric and duodenal wall, theoral contrast material is passed into the proximal small bowel.    Previously noted small gas bubbles are again seen posterior to the posterior wall of the gastric antrum. Findings are consistent with a contained perforated ulcer into the region of the lesser sac with inflammatory changes but without a discrete fluid collection. There is no evidence of pneumoperitoneum or free air elsewhere within the abdomen.    No evidence of ascites within the abdomen or pelvis. The appendix is normal in appearance.    BONES/SOFT TISSUES:    OTHER: Calcification and mild ectasia of abdominal aorta (2.5 cm)      IMPRESSION:    Thickening of the gastric wall in the region of the greater curvature as well as the region of the antrum, and thickening of the duodenal bulb and second portion of the duodenum are again noted. These findings are associated with stranding in the adjacent fat. Findings are consistent with gastritis and duodenitis. Despite the degree of thickening of the gastric and duodenal wall, the oral contrast material has passed into the proximal small bowel.    Previously noted small gas bubbles are again seen posterior to the posterior wall of the gastric antrum (3/25-26). Findings are consistent with a contained perforated ulcer into the region of the lesser sac with inflammatory changes but without a discrete fluid collection. There is no evidence of active extravasation of the oral contrast material. There is no evidence of pneumoperitoneum or free air elsewhere within the abdomen.    < end of copied text >

## 2021-04-14 NOTE — H&P ADULT - HISTORY OF PRESENT ILLNESS
GENERAL SURGERY CONSULT NOTE    Patient: KELLY DELGADO , 80y ([birth year])Male   MRN: [id number]  Location: [facility] ED  Visit: 04-13-21 Emergency  Date: 04-14-21 @ 00:14    HPI:  79yo M, PMHx GERD, HTN, CAD, HLD, CKD3, CABGx4, presents for abdominal pain. Pt was sent from urgent care after BRBPR was found. Reports right sided intermittent abdominal pain x 1W with recent generalized weakness and increasing DIAZ. Pt denies history of colonoscopy. Most recent hgb per son was 8.9 on 3/29/21, and he was started on iron pills. Reports constipation contributing from the iron pills. Pt inconsistently takes Omeprazole due to concerns regarding renal side effects. He denies hematochieza, hematemesis, diarrhea, nausea, vomiting, CP, fever, and chills. He denies any smoking history.    PAST MEDICAL & SURGICAL HISTORY:  CAD (coronary artery disease)    GERD (gastroesophageal reflux disease)

## 2021-04-14 NOTE — H&P ADULT - ASSESSMENT
ASSESSMENT:  80yM w/ PMHx of  GERD, HTN, CAD, HLD, CKD3, CABGx4, who presented with abdominal pain. Physical exam findings, imaging, and labs as documented above.       on repeat CT abd with injecting contrast through NGT Previously noted small gas bubbles are again seen posterior to the posterior wall of the gastric antrum (3/25-26). Findings are consistent with a contained perforated ulcer into the region of the lesser sac with inflammatory changes but without a discrete fluid collection. There is no evidence of active extravasation of the oral contrast material. There is no evidence of pneumoperitoneum or free air elsewhere within the abdomen.    PLAN:  - ADMIT TO Dr Hansen   - Monitor under SICU   -FU blood transfusion   - keep NPO   - Serial abdominal exams   - pantoprazole infusion     Above plan discussed with Dr. Hansen   ASSESSMENT:  80yM w/ PMHx of  GERD, HTN, CAD, HLD, CKD3, CABGx4, who presented with abdominal pain. Physical exam findings, imaging, and labs as documented above.       on repeat CT abd with injecting contrast through NGT Previously noted small gas bubbles are again seen posterior to the posterior wall of the gastric antrum (3/25-26). Findings are consistent with a contained perforated ulcer into the region of the lesser sac with inflammatory changes but without a discrete fluid collection. There is no evidence of active extravasation of the oral contrast material. There is no evidence of pneumoperitoneum or free air elsewhere within the abdomen.    PLAN:  - ADMIT TO Dr Hansen   - Monitor under SICU   -FU blood transfusion   - keep NPO   - Serial abdominal exams   - pantoprazole infusion   -Hold heparin subQ    Above plan discussed with Dr. Hansen   ASSESSMENT:  80yM w/ PMHx of  GERD, HTN, CAD, HLD, CKD3, CABGx4, who presented with abdominal pain. Physical exam findings, imaging, and labs as documented above.       on repeat CT abd with injecting contrast through NGT Previously noted small gas bubbles are again seen posterior to the posterior wall of the gastric antrum (3/25-26). Findings are consistent with a contained perforated ulcer into the region of the lesser sac with inflammatory changes but without a discrete fluid collection. There is no evidence of active extravasation of the oral contrast material. There is no evidence of pneumoperitoneum or free air elsewhere within the abdomen.    PLAN:  - ADMIT TO Dr Hansen   - Monitor under SICU   - s/p 2 units prbc, f/u hgb  - keep NPO   - Serial abdominal exams   - pantoprazole infusion   -Hold heparin subQ    Above plan discussed with Dr. Hansen  Senior Surgical Resident Note  I have edited the above note and agree with the current treatment plan  Above plan was discussed with Dr. Domingo , patient, patient's family present at bedside, and the [facility] team  ---------------------------------------------------------------------------------------  04-14-21 @ 07:52

## 2021-04-14 NOTE — CHART NOTE - NSCHARTNOTEFT_GEN_A_CORE
80M w/ PMH of CKD 3 (baseline Cr ~1.7), HTN, HLD, GERD, known L kidney mass, pacemaker (non-functioning per pt) and CAD s/p 4-vessel CABG ([facility], 2011) who presented to the ED with 2 weeks of worsening abdominal pain found to have contained perforated gastric antrum ulcer.     NEURO:  Pain: no medications  - serial abdominal exams    RESP:  PMH: none  AM CXR, IS  Activity: ambulate as tolerated      CARDS:   PMH: HTN, HLD, CAD s/p CABG, hx pacemaker  Home meds: aspirin 81, atorvastatin 80QD, metoprolol 25 BID, ranolazine 500 BID - restarted PO metoprolol  CE neg x2, 1 more set pending  EKG: sinus rhythm w/ 1st degree AV block, incomplete RBBB, L anterior fascicular block,   ECHO: none on file, ordered  EPS c/s for pacemaker eval    GI/NUTR:   contained perforated gastric antrum ulcer  - NGT in place  - GI prophylaxis: PTX gtt  - passing gas, last BM Monday, 4/12  Diet: NPO  Bowel regimen: holding  stool h pylori pending     /RENAL:   PMH: CKD 3 (baseline Cr 1.7), known L kidney mass  Strict I/Os  voiding  BUN/Cr- 26/1.7  -->22/1.4  Electrolytes-Lytes-Na 137 // K 4.5 // Phos 3.1 //  Mg 1.8- repleted   LA 1.3    HEME/ONC:   DVT prophylaxis: SCDs  holding home aspirin  H/H 6.6 > 2u pRBC > 7.9    ID:  contained gastric perforation  - antibiotics: cefepime, diflucan, flagyl  - WBC: 12.56 >9  - afebrile    ENDO:  PMH: no PMH  FS Q4H while NPO, ISS  Glucose, Serum: 122 (04-13 @ 15:56)  HA1C pending    LINES/DRAINS: PIVs, NGT     f/u  -serial abd exams  -EPS for pacemaker eval  -h pylori stool, once has BM  -CE @1600  -full set of evening labs     full sign out given SUKHI Delacruz @ 12:30pm

## 2021-04-14 NOTE — H&P ADULT - NSHPPHYSICALEXAM_GEN_ALL_CORE
VITALS:  T(F): 98 (04-13-21 @ 21:25), Max: 99.5 (04-13-21 @ 14:44)  HR: 77 (04-13-21 @ 21:25) (72 - 90)  BP: 119/60 (04-13-21 @ 21:25) (112/53 - 145/45)  RR: 16 (04-13-21 @ 14:44) (16 - 16)  SpO2: 92% (04-13-21 @ 14:44) (92% - 92%)    PHYSICAL EXAM:  General: NAD, AAOx3, calm and cooperative  HEENT: NCAT, ELIEL, EOMI, Trachea ML, Neck supple  Cardiac: RRR S1, S2, no Murmurs, rubs or gallops  Respiratory: CTAB, normal respiratory effort, breath sounds equal BL, no wheeze, rhonchi or crackles  Abdomen: Soft, non-distended, non-tender, no rebound, no guarding. +BS.

## 2021-04-14 NOTE — CONSULT NOTE ADULT - ASSESSMENT
Assessment & Plan  80M w/ PMH of CKD 3 (baseline Cr ~1.7), HTN, HLD, GERD, known L kidney mass, pacemaker (non-functioning per pt) and CAD s/p 4-vessel CABG ([facility], 2011) who presented to the ED with 2 weeks of worsening abdominal pain.     NEURO:  Pain:     RESP:   PMH:   Home meds:   AM CXR, IS  Activity:       CARDS:   PMH:   Home meds:   EKG:   ECHO:     GI/NUTR:   Diet: NPO  GI prophylaxis: PTX gtt  Bowel regimen: holding    /RENAL:   Strict I/Os  BUN/Cr- Monitor UO-gallegos in place  Labs:          BUN/Cr- 26/1.7  -->          Electrolytes-Na 131 // K 4.3 // Mg -- //  Phos -- (04-13 @ 15:56)    HEME/ONC:   DVT prophylaxis:   H/H 6.6 (04-13 @ 15:56)    ID:  contained gastric perforation  - antibiotics: cefepime, diflucan, flagyl  - WBC:     ENDO:  PMH:   Home meds:   Glucose, Serum: 122 (04-13 @ 15:56)  HA1C     LINES/DRAINS: PIV, NGT   DISPO: SICU Assessment & Plan  80M w/ PMH of CKD 3 (baseline Cr ~1.7), HTN, HLD, GERD, known L kidney mass, pacemaker (non-functioning per pt) and CAD s/p 4-vessel CABG ([facility], 2011) who presented to the ED with 2 weeks of worsening abdominal pain found to have contained perforated gastric antrum ulcer.     NEURO:  Pain: no medications  - serial abdominal exams    RESP:  PMH: none  AM CXR, IS  Activity: ambulate as tolerated      CARDS:   PMH: HTN, HLD, CAD s/p CABG, hx pacemaker  Home meds: aspirin 81, atorvastatin 80QD, metoprolol 25 BID, ranolazine 500 BID -- on IV metoprolol 5 Q6H  EKG: sinus rhythm w/ 1st degree AV block, incomplete RBBB, L anterior fascicular block,   ECHO: none on file, ordered    GI/NUTR:   contained perforated gastric antrum ulcer  - NGT in place  - GI prophylaxis: PTX gtt  - passing gas, last BM Monday, 4/12  Diet: NPO  Bowel regimen: holding    /RENAL:   PMH: CKD 3 (baseline Cr 1.7), known L kidney mass  Strict I/Os  BUN/Cr- 26/1.7  -->  Electrolytes-Na 131 // K 4.3 // Mg -- //  Phos -- (04-13 @ 15:56)    HEME/ONC:   DVT prophylaxis: SCDs  holding home aspirin  H/H 6.6 > 2u pRBC > 430 labs pending    ID:  contained gastric perforation  - antibiotics: cefepime, diflucan, flagyl  - WBC: 12.56  - afebrile    ENDO:  PMH: no PMH  FS Q4H while NPO, ISS  Glucose, Serum: 122 (04-13 @ 15:56)  HA1C pending    LINES/DRAINS: PIVs, NGT   DISPO: SICU

## 2021-04-14 NOTE — H&P ADULT - NSICDXPASTMEDICALHX_GEN_ALL_CORE_FT
PAST MEDICAL HISTORY:  CAD (coronary artery disease)     GERD (gastroesophageal reflux disease)

## 2021-04-14 NOTE — H&P ADULT - NSICDXPILOT_GEN_ALL_CORE
Call: all of your labs were normal except your cholesterol and your A1C. Due to your age and high blood pressure, you need to be on cholesterol medication. A prescription will be sent to your pharmacy. Please take as directed. Return in 6 months for blood pressure check and labs. Continue to work on diet and exercise to reduce your risk of developing diabetes. [place]

## 2021-04-14 NOTE — ED ADULT NURSE REASSESSMENT NOTE - NS ED NURSE REASSESS COMMENT FT1
received pt main. pt denies pain or discomfort at this time. vss. transfusion complete. cardiac monitoring continued. safety precautions maintained. will continue to monitor.

## 2021-04-14 NOTE — CONSULT NOTE ADULT - ASSESSMENT
81 Yo  M Hx of CAD , CKD admitted for acute/subacute anemia with outside evidence of FELICITY. CT revealed suspicion for a possible distal antral ulceration with associated thickening around with concerns for contained perforation vs additional ulcers.  DDx includes PUD (HP vs NSAID vs ideopathic) Or malignant ulcers- CANNOT rule out perf though clinically soft.    Rec:  PPI IV drip  IV Abx and antifugal  NPO  Trend CBC and transfuse  Would manage medically until can perform EGD safely (hemostasis goals and diognostic)  Check serum HP Ab 81 Yo  M Hx of CAD , CKD admitted for acute/subacute anemia with outside evidence of FELICITY. CT revealed suspicion for a possible distal antral ulceration with associated thickening around with concerns for contained perforation vs additional ulcers.  DDx includes PUD (HP vs NSAID vs ideopathic) Or malignant ulcers- CANNOT rule out perf though clinically soft.    Rec:  PPI IV drip  IV Abx and antifugal  NPO  Trend CBC and transfuse  Would manage medically until can perform EGD safely (hemostasis goals and diagnostic)  Check serum HP Ab

## 2021-04-15 LAB
ANION GAP SERPL CALC-SCNC: 11 MMOL/L — SIGNIFICANT CHANGE UP (ref 7–14)
APTT BLD: 29.8 SEC — SIGNIFICANT CHANGE UP (ref 27–39.2)
BUN SERPL-MCNC: 18 MG/DL — SIGNIFICANT CHANGE UP (ref 10–20)
CALCIUM SERPL-MCNC: 8 MG/DL — LOW (ref 8.5–10.1)
CHLORIDE SERPL-SCNC: 108 MMOL/L — SIGNIFICANT CHANGE UP (ref 98–110)
CO2 SERPL-SCNC: 18 MMOL/L — SIGNIFICANT CHANGE UP (ref 17–32)
CREAT SERPL-MCNC: 1.4 MG/DL — SIGNIFICANT CHANGE UP (ref 0.7–1.5)
GLUCOSE SERPL-MCNC: 78 MG/DL — SIGNIFICANT CHANGE UP (ref 70–99)
HCT VFR BLD CALC: 23.4 % — LOW (ref 42–52)
HGB BLD-MCNC: 7.5 G/DL — LOW (ref 14–18)
INR BLD: 1.21 RATIO — SIGNIFICANT CHANGE UP (ref 0.65–1.3)
MAGNESIUM SERPL-MCNC: 1.9 MG/DL — SIGNIFICANT CHANGE UP (ref 1.8–2.4)
MCHC RBC-ENTMCNC: 29.8 PG — SIGNIFICANT CHANGE UP (ref 27–31)
MCHC RBC-ENTMCNC: 32.1 G/DL — SIGNIFICANT CHANGE UP (ref 32–37)
MCV RBC AUTO: 92.9 FL — SIGNIFICANT CHANGE UP (ref 80–94)
NRBC # BLD: 0 /100 WBCS — SIGNIFICANT CHANGE UP (ref 0–0)
PHOSPHATE SERPL-MCNC: 4.3 MG/DL — SIGNIFICANT CHANGE UP (ref 2.1–4.9)
PLATELET # BLD AUTO: 211 K/UL — SIGNIFICANT CHANGE UP (ref 130–400)
POTASSIUM SERPL-MCNC: 4.1 MMOL/L — SIGNIFICANT CHANGE UP (ref 3.5–5)
POTASSIUM SERPL-SCNC: 4.1 MMOL/L — SIGNIFICANT CHANGE UP (ref 3.5–5)
PROTHROM AB SERPL-ACNC: 13.9 SEC — HIGH (ref 9.95–12.87)
RBC # BLD: 2.52 M/UL — LOW (ref 4.7–6.1)
RBC # FLD: 19.6 % — HIGH (ref 11.5–14.5)
SODIUM SERPL-SCNC: 137 MMOL/L — SIGNIFICANT CHANGE UP (ref 135–146)
WBC # BLD: 6.66 K/UL — SIGNIFICANT CHANGE UP (ref 4.8–10.8)
WBC # FLD AUTO: 6.66 K/UL — SIGNIFICANT CHANGE UP (ref 4.8–10.8)

## 2021-04-15 PROCEDURE — 93306 TTE W/DOPPLER COMPLETE: CPT | Mod: 26

## 2021-04-15 PROCEDURE — 99223 1ST HOSP IP/OBS HIGH 75: CPT

## 2021-04-15 PROCEDURE — 71045 X-RAY EXAM CHEST 1 VIEW: CPT | Mod: 26

## 2021-04-15 RX ORDER — SODIUM CHLORIDE 9 MG/ML
1000 INJECTION, SOLUTION INTRAVENOUS
Refills: 0 | Status: DISCONTINUED | OUTPATIENT
Start: 2021-04-15 | End: 2021-04-17

## 2021-04-15 RX ADMIN — SODIUM CHLORIDE 100 MILLILITER(S): 9 INJECTION INTRAMUSCULAR; INTRAVENOUS; SUBCUTANEOUS at 00:28

## 2021-04-15 RX ADMIN — CEFEPIME 100 MILLIGRAM(S): 1 INJECTION, POWDER, FOR SOLUTION INTRAMUSCULAR; INTRAVENOUS at 05:22

## 2021-04-15 RX ADMIN — PANTOPRAZOLE SODIUM 10 MG/HR: 20 TABLET, DELAYED RELEASE ORAL at 21:29

## 2021-04-15 RX ADMIN — CEFEPIME 100 MILLIGRAM(S): 1 INJECTION, POWDER, FOR SOLUTION INTRAMUSCULAR; INTRAVENOUS at 17:57

## 2021-04-15 RX ADMIN — Medication 5 MILLIGRAM(S): at 00:27

## 2021-04-15 RX ADMIN — Medication 5 MILLIGRAM(S): at 17:57

## 2021-04-15 RX ADMIN — Medication 5 MILLIGRAM(S): at 05:23

## 2021-04-15 RX ADMIN — Medication 100 MILLIGRAM(S): at 05:22

## 2021-04-15 RX ADMIN — Medication 5 MILLIGRAM(S): at 12:57

## 2021-04-15 RX ADMIN — SODIUM CHLORIDE 100 MILLILITER(S): 9 INJECTION, SOLUTION INTRAVENOUS at 12:48

## 2021-04-15 RX ADMIN — Medication 5 MILLIGRAM(S): at 23:40

## 2021-04-15 RX ADMIN — Medication 100 MILLIGRAM(S): at 13:37

## 2021-04-15 RX ADMIN — PANTOPRAZOLE SODIUM 10 MG/HR: 20 TABLET, DELAYED RELEASE ORAL at 12:48

## 2021-04-15 RX ADMIN — Medication 1 SPRAY(S): at 13:42

## 2021-04-15 RX ADMIN — CHLORHEXIDINE GLUCONATE 1 APPLICATION(S): 213 SOLUTION TOPICAL at 06:56

## 2021-04-15 RX ADMIN — Medication 100 MILLIGRAM(S): at 21:29

## 2021-04-15 RX ADMIN — SODIUM CHLORIDE 100 MILLILITER(S): 9 INJECTION, SOLUTION INTRAVENOUS at 21:29

## 2021-04-15 RX ADMIN — PANTOPRAZOLE SODIUM 10 MG/HR: 20 TABLET, DELAYED RELEASE ORAL at 07:45

## 2021-04-15 RX ADMIN — SODIUM CHLORIDE 100 MILLILITER(S): 9 INJECTION INTRAMUSCULAR; INTRAVENOUS; SUBCUTANEOUS at 07:44

## 2021-04-15 RX ADMIN — FLUCONAZOLE 100 MILLIGRAM(S): 150 TABLET ORAL at 05:23

## 2021-04-15 RX ADMIN — PANTOPRAZOLE SODIUM 10 MG/HR: 20 TABLET, DELAYED RELEASE ORAL at 00:28

## 2021-04-15 NOTE — PROGRESS NOTE ADULT - ASSESSMENT
Monitor hemoglobin  Serial abdominal exams  Follow up advanced GI  Pain management   NPO   NGT to suction

## 2021-04-15 NOTE — CONSULT NOTE ADULT - SUBJECTIVE AND OBJECTIVE BOX
Date of Admission: 04-14-21    CHIEF COMPLAINT: Patient is a 80y old  Male who presents with a chief complaint of contained perforated gastric ulcer (15 Apr 2021 01:10)      HPI:  GENERAL SURGERY CONSULT NOTE    Patient: KELLY DELGADO , 80y ([birth year])Male   MRN: [id number]  Location: [facility] [place] ED  Visit: 04-13-21 Emergency  Date: 04-14-21 @ 00:14    HPI:  81yo M, PMHx GERD, HTN, CAD, HLD, CKD3, CABGx4, presents for abdominal pain. Pt was sent from urgent care after BRBPR was found. Reports right sided intermittent abdominal pain x 1W with recent generalized weakness and increasing DIAZ. Pt denies history of colonoscopy. Most recent hgb per son was 8.9 on 3/29/21, and he was started on iron pills. Reports constipation contributing from the iron pills. Pt inconsistently takes Omeprazole due to concerns regarding renal side effects. He denies hematochieza, hematemesis, diarrhea, nausea, vomiting, CP, fever, and chills. He denies any smoking history.    PAST MEDICAL & SURGICAL HISTORY:  CAD (coronary artery disease)    GERD (gastroesophageal reflux disease)     (14 Apr 2021 00:10)      PAST MEDICAL & SURGICAL HISTORY:  CAD (coronary artery disease)    GERD (gastroesophageal reflux disease)        FAMILY HISTORY:  [x] no pertinent family history of premature cardiovascular disease in first degree relatives.    SOCIAL HISTORY:    [  ] Non-smoker  [  ] Ex-Smoker  [x] No alcohol use  [x] No illicit drug use    Allergies:   No Known Allergies    	    REVIEW OF SYSTEMS:  CONSTITUTIONAL: No fever, weight loss, or fatigue. (+) Generalized weakness.  CARDIOLOGY: (+) Dyspnea of exertion. No chest pain, or syncopal episodes.   RESPIRATORY: No shortness of breath, cough, wheezing.   NEUROLOGICAL: No weakness, no focal deficits to report.  GI: No BRBPR, no N,V,diarrhea. (+) Abdominal pain.   PSYCHIATRY: Normal mood and affect.  HEENT: No nasal discharge, no ecchymosis  SKIN: No ecchymosis, no breakdown  MUSCULOSKELETAL: Full range of motion x4.   EXTREM: No leg swelling or erythema.    PHYSICAL EXAM:  T(C): 36.2 (04-15-21 @ 05:00), Max: 37.1 (04-14-21 @ 21:17)  HR: 69 (04-15-21 @ 05:00) (69 - 85)  BP: 141/66 (04-15-21 @ 05:00) (141/66 - 193/75)  RR: 18 (04-15-21 @ 05:00) (18 - 20)  SpO2: 93% (04-14-21 @ 21:17) (93% - 100%)  Wt(kg): --  I&O's Summary    14 Apr 2021 07:01  -  15 Apr 2021 07:00  --------------------------------------------------------  IN: 2200 mL / OUT: 1310 mL / NET: 890 mL    15 Apr 2021 07:01  -  15 Apr 2021 10:24  --------------------------------------------------------  IN: 220 mL / OUT: 300 mL / NET: -80 mL        General Appearance: NAD, normal for age and gender.   Neck: Normal JVP, no bruit.   Eyes: No xanthomalasia, Extra Ocular muscles intact.   Cardiovascular: Regular rate and rhythm S1 S2, No JVD, No murmurs.  Respiratory: Lungs clear to auscultation. No wheezes, rales or rhonchi.  Psychiatry: Alert and oriented x 3, Mood & affect appropriate  Gastrointestinal:  Soft, Non-tender  Skin/Integumen: No rashes, No ecchymoses, No cyanosis	  Neurologic: Non-focal deficits.  Musculoskeletal/ extremities: Normal range of motion, No clubbing, cyanosis or edema  Vascular: Peripheral pulses palpable bilaterally    LABS:	 	                        7.5    6.66  )-----------( 211      ( 15 Apr 2021 00:23 )             23.4     04-15    137  |  108  |  18  ----------------------------<  78  4.1   |  18  |  1.4    Ca    8.0<L>      15 Apr 2021 00:23  Phos  4.3     04-15  Mg     1.9     04-15    TPro  5.7<L>  /  Alb  3.6  /  TBili  0.4  /  DBili  0.2  /  AST  14  /  ALT  11  /  AlkPhos  90  04-13    CARDIAC MARKERS ( 14 Apr 2021 16:33 )  x     / <0.01 ng/mL / x     / x     / x      CARDIAC MARKERS ( 14 Apr 2021 09:11 )  x     / <0.01 ng/mL / 141 U/L / x     / 3.6 ng/mL  CARDIAC MARKERS ( 14 Apr 2021 04:30 )  x     / x     / x     / x     / 4.0 ng/mL  CARDIAC MARKERS ( 13 Apr 2021 15:56 )  x     / <0.01 ng/mL / x     / x     / x          PT/INR - ( 15 Apr 2021 00:23 )   PT: 13.90 sec;   INR: 1.21 ratio    PTT - ( 15 Apr 2021 00:23 )  PTT:29.8 sec    TELEMETRY EVENTS: 	    ECG: < from: 12 Lead ECG (04.13.21 @ 16:34) >  Diagnosis Line Sinus rhythm rmit0sy degree A-V block  Incomplete right bundle branch block  Left anterior fascicular block  Nonspecific ST and T wave abnormality  Abnormal ECG   	  RADIOLOGY: < from: Xray Chest 1 View-PORTABLE IMMEDIATE (Xray Chest 1 View-PORTABLE IMMEDIATE .) (04.13.21 @ 21:40) >  No radiographic evidence of acute cardiopulmonary disease.    Feeding tube terminates in distal esophagus.    < from: CT Abdomen and Pelvis w/ Oral Cont (04.13.21 @ 22:09) >  IMPRESSION:    Thickening of the gastric wall in the region of the greater curvature as well as the region of the antrum, and thickening of the duodenal bulb and second portion of the duodenum are again noted. These findings are associated with stranding in the adjacent fat. Findings are consistent with gastritis and duodenitis. Despite the degree of thickening of the gastric and duodenal wall, the oral contrast material has passed into the proximal small bowel.    Previously noted small gas bubbles are again seen posterior to the posterior wall of the gastric antrum (3/25-26). Findings are consistent with a contained perforated ulcer into the region of the lesser sac with inflammatory changes but without a discrete fluid collection. There is no evidence of active extravasation of the oral contrast material. There is no evidence of pneumoperitoneum or free air elsewhere within the abdomen.      OTHER: 	    PREVIOUS DIAGNOSTIC TESTING:    [x] Echocardiogram: < from: TTE Echo Complete w/o Contrast w/ Doppler (04.15.21 @ 08:22) >  Summary:   1. LV Ejection Fraction by Krishna's Method with a biplane EF of 68 %.   2. Spectral Doppler shows impaired relaxation pattern of left ventricular myocardial filling (Grade I diastolic dysfunction).   3. Mildly enlarged left atrium.   4. Normal right atrial size.   5. Mild mitral annular calcification.   6. Mild mitral valve regurgitation.   7. Mild tricuspid regurgitation.   8. Mild aortic regurgitation.   9. Mild pulmonic valve regurgitation.  10. Estimated pulmonary artery systolic pressure is 41.7 mmHg assuming a right atrial pressure of 3 mmHg, which is consistent with mild pulmonary hypertension.    [ ] Catheterization:  [ ] Stress Test:    	  Home Medications:  aspirin 81 mg oral tablet, chewable: 1 tab(s) orally once a day (14 Apr 2021 00:37)  atorvastatin 80 mg oral tablet: 1 tab(s) orally once a day (14 Apr 2021 00:37)  iron polysaccharide (as elemental iron) 60 mg oral capsule: 1 tab(s) orally 2 times a day (14 Apr 2021 01:59)  metoprolol succinate 25 mg oral tablet, extended release: 1 tab(s) orally 2 times a day (14 Apr 2021 01:57)  ranolazine 500 mg oral tablet, extended release: 1 tab(s) orally 2 times a day (14 Apr 2021 01:58)  Vitamin D3 2000 intl units (50 mcg) oral tablet: 1 tab(s) orally 2 times a day (14 Apr 2021 01:58)    MEDICATIONS  (STANDING):  cefepime   IVPB 1000 milliGRAM(s) IV Intermittent every 12 hours  chlorhexidine 4% Liquid 1 Application(s) Topical <User Schedule>  fluconAZOLE IVPB 200 milliGRAM(s) IV Intermittent every 24 hours  metoprolol tartrate Injectable 5 milliGRAM(s) IV Push every 6 hours  metroNIDAZOLE  IVPB 500 milliGRAM(s) IV Intermittent every 8 hours  metroNIDAZOLE  IVPB      pantoprazole Infusion 8 mG/Hr (10 mL/Hr) IV Continuous <Continuous>  sodium chloride 0.9%. 1000 milliLiter(s) (100 mL/Hr) IV Continuous <Continuous>    MEDICATIONS  (PRN):  benzocaine 20% Spray 1 Spray(s) Topical four times a day PRN throat pain         Date of Admission: 04-14-21    CHIEF COMPLAINT: Patient is a 80y old  Male who presents with a chief complaint of contained perforated gastric ulcer (15 Apr 2021 01:10)      HPI:  GENERAL SURGERY CONSULT NOTE    Patient: KELLY DELGADO , 80y ([birth year])Male   MRN: [id number]  Location: [facility] ED  Visit: 04-13-21 Emergency  Date: 04-14-21 @ 00:14    HPI:  81yo M, PMHx GERD, HTN, CAD, HLD, CKD3, CABGx4, presents for abdominal pain. Pt was sent from urgent care after BRBPR was found. Reports right sided intermittent abdominal pain x 1W with recent generalized weakness and increasing DIAZ. Pt denies history of colonoscopy. Most recent hgb per son was 8.9 on 3/29/21, and he was started on iron pills. Reports constipation contributing from the iron pills. Pt inconsistently takes Omeprazole due to concerns regarding renal side effects. He denies hematochieza, hematemesis, diarrhea, nausea, vomiting, CP, fever, and chills. He denies any smoking history.    PAST MEDICAL & SURGICAL HISTORY:  CAD (coronary artery disease)    GERD (gastroesophageal reflux disease)     (14 Apr 2021 00:10)      PAST MEDICAL & SURGICAL HISTORY:  CAD (coronary artery disease)    GERD (gastroesophageal reflux disease)        FAMILY HISTORY:  [x] no pertinent family history of premature cardiovascular disease in first degree relatives.    SOCIAL HISTORY:    [x] Ex-Smoker quit 15 years ago  [x] No alcohol use  [x] No illicit drug use    Allergies:   No Known Allergies	    REVIEW OF SYSTEMS:  CONSTITUTIONAL: No fever, weight loss, or fatigue. (+) Generalized weakness.  CARDIOLOGY: (+) Dyspnea of exertion. No chest pain, or syncopal episodes.   RESPIRATORY: No shortness of breath, cough, wheezing.   NEUROLOGICAL: No weakness, no focal deficits to report.  GI: No BRBPR, no N,V,diarrhea. (+) Abdominal pain.   PSYCHIATRY: Normal mood and affect.  HEENT: No nasal discharge, no ecchymosis  SKIN: No ecchymosis, no breakdown  MUSCULOSKELETAL: Full range of motion x4.   EXTREM: No leg swelling or erythema.    PHYSICAL EXAM:  T(C): 36.2 (04-15-21 @ 05:00), Max: 37.1 (04-14-21 @ 21:17)  HR: 69 (04-15-21 @ 05:00) (69 - 85)  BP: 141/66 (04-15-21 @ 05:00) (141/66 - 193/75)  RR: 18 (04-15-21 @ 05:00) (18 - 20)  SpO2: 93% (04-14-21 @ 21:17) (93% - 100%)  Wt(kg): --  I&O's Summary    14 Apr 2021 07:01  -  15 Apr 2021 07:00  --------------------------------------------------------  IN: 2200 mL / OUT: 1310 mL / NET: 890 mL    15 Apr 2021 07:01  -  15 Apr 2021 10:24  --------------------------------------------------------  IN: 220 mL / OUT: 300 mL / NET: -80 mL    General Appearance: NAD, normal for age and gender.   Neck: Normal JVP, no bruit.   Eyes: No xanthomalasia, Extra Ocular muscles intact.   Cardiovascular: Regular rate and rhythm S1 S2, No JVD. Diastolic murmur LLSB.  Respiratory: Lungs clear to auscultation. No wheezes, rales or rhonchi.  Psychiatry: Alert and oriented x 3, Mood & affect appropriate  Gastrointestinal:  Soft, Non-tender  Skin/Integumen: No rashes, No ecchymoses, No cyanosis	  Neurologic: Non-focal deficits.  Musculoskeletal/ extremities: Normal range of motion, No clubbing, cyanosis or edema. (+) B/L venous stasis.  Vascular: Peripheral pulses palpable bilaterally    LABS:	 	                        7.5    6.66  )-----------( 211      ( 15 Apr 2021 00:23 )             23.4     04-15    137  |  108  |  18  ----------------------------<  78  4.1   |  18  |  1.4    Ca    8.0<L>      15 Apr 2021 00:23  Phos  4.3     04-15  Mg     1.9     04-15    TPro  5.7<L>  /  Alb  3.6  /  TBili  0.4  /  DBili  0.2  /  AST  14  /  ALT  11  /  AlkPhos  90  04-13    CARDIAC MARKERS ( 14 Apr 2021 16:33 )  x     / <0.01 ng/mL / x     / x     / x      CARDIAC MARKERS ( 14 Apr 2021 09:11 )  x     / <0.01 ng/mL / 141 U/L / x     / 3.6 ng/mL  CARDIAC MARKERS ( 14 Apr 2021 04:30 )  x     / x     / x     / x     / 4.0 ng/mL  CARDIAC MARKERS ( 13 Apr 2021 15:56 )  x     / <0.01 ng/mL / x     / x     / x          PT/INR - ( 15 Apr 2021 00:23 )   PT: 13.90 sec;   INR: 1.21 ratio    PTT - ( 15 Apr 2021 00:23 )  PTT:29.8 sec    TELEMETRY EVENTS: 	    ECG: < from: 12 Lead ECG (04.13.21 @ 16:34) >  Diagnosis Line Sinus rhythm invj8ul degree A-V block  Incomplete right bundle branch block  Left anterior fascicular block  Nonspecific ST and T wave abnormality  Abnormal ECG   	  RADIOLOGY: < from: Xray Chest 1 View-PORTABLE IMMEDIATE (Xray Chest 1 View-PORTABLE IMMEDIATE .) (04.13.21 @ 21:40) >  No radiographic evidence of acute cardiopulmonary disease.    Feeding tube terminates in distal esophagus.    < from: CT Abdomen and Pelvis w/ Oral Cont (04.13.21 @ 22:09) >  IMPRESSION:    Thickening of the gastric wall in the region of the greater curvature as well as the region of the antrum, and thickening of the duodenal bulb and second portion of the duodenum are again noted. These findings are associated with stranding in the adjacent fat. Findings are consistent with gastritis and duodenitis. Despite the degree of thickening of the gastric and duodenal wall, the oral contrast material has passed into the proximal small bowel.    Previously noted small gas bubbles are again seen posterior to the posterior wall of the gastric antrum (3/25-26). Findings are consistent with a contained perforated ulcer into the region of the lesser sac with inflammatory changes but without a discrete fluid collection. There is no evidence of active extravasation of the oral contrast material. There is no evidence of pneumoperitoneum or free air elsewhere within the abdomen.      OTHER: 	    PREVIOUS DIAGNOSTIC TESTING:    [x] Echocardiogram: < from: TTE Echo Complete w/o Contrast w/ Doppler (04.15.21 @ 08:22) >  Summary:   1. LV Ejection Fraction by Krishna's Method with a biplane EF of 68 %.   2. Spectral Doppler shows impaired relaxation pattern of left ventricular myocardial filling (Grade I diastolic dysfunction).   3. Mildly enlarged left atrium.   4. Normal right atrial size.   5. Mild mitral annular calcification.   6. Mild mitral valve regurgitation.   7. Mild tricuspid regurgitation.   8. Mild aortic regurgitation.   9. Mild pulmonic valve regurgitation.  10. Estimated pulmonary artery systolic pressure is 41.7 mmHg assuming a right atrial pressure of 3 mmHg, which is consistent with mild pulmonary hypertension.    [ ] Catheterization:  [ ] Stress Test:    	  Home Medications:  aspirin 81 mg oral tablet, chewable: 1 tab(s) orally once a day (14 Apr 2021 00:37)  atorvastatin 80 mg oral tablet: 1 tab(s) orally once a day (14 Apr 2021 00:37)  iron polysaccharide (as elemental iron) 60 mg oral capsule: 1 tab(s) orally 2 times a day (14 Apr 2021 01:59)  metoprolol succinate 25 mg oral tablet, extended release: 1 tab(s) orally 2 times a day (14 Apr 2021 01:57)  ranolazine 500 mg oral tablet, extended release: 1 tab(s) orally 2 times a day (14 Apr 2021 01:58)  Vitamin D3 2000 intl units (50 mcg) oral tablet: 1 tab(s) orally 2 times a day (14 Apr 2021 01:58)    MEDICATIONS  (STANDING):  cefepime   IVPB 1000 milliGRAM(s) IV Intermittent every 12 hours  chlorhexidine 4% Liquid 1 Application(s) Topical <User Schedule>  fluconAZOLE IVPB 200 milliGRAM(s) IV Intermittent every 24 hours  metoprolol tartrate Injectable 5 milliGRAM(s) IV Push every 6 hours  metroNIDAZOLE  IVPB 500 milliGRAM(s) IV Intermittent every 8 hours  metroNIDAZOLE  IVPB      pantoprazole Infusion 8 mG/Hr (10 mL/Hr) IV Continuous <Continuous>  sodium chloride 0.9%. 1000 milliLiter(s) (100 mL/Hr) IV Continuous <Continuous>    MEDICATIONS  (PRN):  benzocaine 20% Spray 1 Spray(s) Topical four times a day PRN throat pain         Date of Admission: 04-14-21    CHIEF COMPLAINT: Patient is a 80y old  Male who presents with a chief complaint of contained perforated gastric ulcer (15 Apr 2021 01:10)        HPI:  GENERAL SURGERY CONSULT NOTE    Patient: KELLY DELGADO , 80y ([birth year])Male   MRN: [id number]  Location: [facility] ED  Visit: 04-13-21 Emergency  Date: 04-14-21 @ 00:14    HPI:  81yo M, PMHx GERD, HTN, CAD, HLD, CKD3, CABGx4, presents for abdominal pain. Pt was sent from urgent care after BRBPR was found. Reports right sided intermittent abdominal pain x 1W with recent generalized weakness and increasing DIAZ. Pt denies history of colonoscopy. Most recent hgb per son was 8.9 on 3/29/21, and he was started on iron pills. Reports constipation contributing from the iron pills. Pt inconsistently takes Omeprazole due to concerns regarding renal side effects. He denies hematochieza, hematemesis, diarrhea, nausea, vomiting, CP, fever, and chills. He denies any smoking history.    PAST MEDICAL & SURGICAL HISTORY:  CAD (coronary artery disease)    GERD (gastroesophageal reflux disease)     (14 Apr 2021 00:10)      PAST MEDICAL & SURGICAL HISTORY:  CAD (coronary artery disease)    GERD (gastroesophageal reflux disease)        FAMILY HISTORY:  [x] no pertinent family history of premature cardiovascular disease in first degree relatives.    SOCIAL HISTORY:    [x] Ex-Smoker quit 15 years ago  [x] No alcohol use  [x] No illicit drug use    Allergies:   No Known Allergies	    REVIEW OF SYSTEMS:  CONSTITUTIONAL: No fever, weight loss, or fatigue. (+) Generalized weakness.  CARDIOLOGY: (+) Dyspnea of exertion. No chest pain, or syncopal episodes.   RESPIRATORY: No shortness of breath, cough, wheezing.   NEUROLOGICAL: No weakness, no focal deficits to report.  GI: No BRBPR, no N,V,diarrhea. (+) Abdominal pain.   PSYCHIATRY: Normal mood and affect.  HEENT: No nasal discharge, no ecchymosis  SKIN: No ecchymosis, no breakdown  MUSCULOSKELETAL: Full range of motion x4.   EXTREM: No leg swelling or erythema.    PHYSICAL EXAM:  T(C): 36.2 (04-15-21 @ 05:00), Max: 37.1 (04-14-21 @ 21:17)  HR: 69 (04-15-21 @ 05:00) (69 - 85)  BP: 141/66 (04-15-21 @ 05:00) (141/66 - 193/75)  RR: 18 (04-15-21 @ 05:00) (18 - 20)  SpO2: 93% (04-14-21 @ 21:17) (93% - 100%)  Wt(kg): --  I&O's Summary    14 Apr 2021 07:01  -  15 Apr 2021 07:00  --------------------------------------------------------  IN: 2200 mL / OUT: 1310 mL / NET: 890 mL    15 Apr 2021 07:01  -  15 Apr 2021 10:24  --------------------------------------------------------  IN: 220 mL / OUT: 300 mL / NET: -80 mL    General Appearance: NAD, normal for age and gender.   Neck: Normal JVP, no bruit.   Eyes: No xanthomalasia, Extra Ocular muscles intact.   Cardiovascular: Regular rate and rhythm S1 S2, No JVD. Diastolic murmur LLSB.  Respiratory: Lungs clear to auscultation. No wheezes, rales or rhonchi.  Psychiatry: Alert and oriented x 3, Mood & affect appropriate  Gastrointestinal:  Soft, Non-tender  Skin/Integumen: No rashes, No ecchymoses, No cyanosis	  Neurologic: Non-focal deficits.  Musculoskeletal/ extremities: Normal range of motion, No clubbing, cyanosis or edema. (+) B/L venous stasis.  Vascular: Peripheral pulses palpable bilaterally    LABS:	 	                        7.5    6.66  )-----------( 211      ( 15 Apr 2021 00:23 )             23.4     04-15    137  |  108  |  18  ----------------------------<  78  4.1   |  18  |  1.4    Ca    8.0<L>      15 Apr 2021 00:23  Phos  4.3     04-15  Mg     1.9     04-15    TPro  5.7<L>  /  Alb  3.6  /  TBili  0.4  /  DBili  0.2  /  AST  14  /  ALT  11  /  AlkPhos  90  04-13    CARDIAC MARKERS ( 14 Apr 2021 16:33 )  x     / <0.01 ng/mL / x     / x     / x      CARDIAC MARKERS ( 14 Apr 2021 09:11 )  x     / <0.01 ng/mL / 141 U/L / x     / 3.6 ng/mL  CARDIAC MARKERS ( 14 Apr 2021 04:30 )  x     / x     / x     / x     / 4.0 ng/mL  CARDIAC MARKERS ( 13 Apr 2021 15:56 )  x     / <0.01 ng/mL / x     / x     / x          PT/INR - ( 15 Apr 2021 00:23 )   PT: 13.90 sec;   INR: 1.21 ratio    PTT - ( 15 Apr 2021 00:23 )  PTT:29.8 sec    TELEMETRY EVENTS: 	    ECG: < from: 12 Lead ECG (04.13.21 @ 16:34) >  Diagnosis Line Sinus rhythm [id number] degree A-V block  Incomplete right bundle branch block  Left anterior fascicular block  Nonspecific ST and T wave abnormality  Abnormal ECG   	  RADIOLOGY: < from: Xray Chest 1 View-PORTABLE IMMEDIATE (Xray Chest 1 View-PORTABLE IMMEDIATE .) (04.13.21 @ 21:40) >  No radiographic evidence of acute cardiopulmonary disease.    Feeding tube terminates in distal esophagus.    < from: CT Abdomen and Pelvis w/ Oral Cont (04.13.21 @ 22:09) >  IMPRESSION:    Thickening of the gastric wall in the region of the greater curvature as well as the region of the antrum, and thickening of the duodenal bulb and second portion of the duodenum are again noted. These findings are associated with stranding in the adjacent fat. Findings are consistent with gastritis and duodenitis. Despite the degree of thickening of the gastric and duodenal wall, the oral contrast material has passed into the proximal small bowel.    Previously noted small gas bubbles are again seen posterior to the posterior wall of the gastric antrum (3/25-26). Findings are consistent with a contained perforated ulcer into the region of the lesser sac with inflammatory changes but without a discrete fluid collection. There is no evidence of active extravasation of the oral contrast material. There is no evidence of pneumoperitoneum or free air elsewhere within the abdomen.      OTHER: 	    PREVIOUS DIAGNOSTIC TESTING:    [x] Echocardiogram: < from: TTE Echo Complete w/o Contrast w/ Doppler (04.15.21 @ 08:22) >  Summary:   1. LV Ejection Fraction by Krishna's Method with a biplane EF of 68 %.   2. Spectral Doppler shows impaired relaxation pattern of left ventricular myocardial filling (Grade I diastolic dysfunction).   3. Mildly enlarged left atrium.   4. Normal right atrial size.   5. Mild mitral annular calcification.   6. Mild mitral valve regurgitation.   7. Mild tricuspid regurgitation.   8. Mild aortic regurgitation.   9. Mild pulmonic valve regurgitation.  10. Estimated pulmonary artery systolic pressure is 41.7 mmHg assuming a right atrial pressure of 3 mmHg, which is consistent with mild pulmonary hypertension.    [ ] Catheterization:  [ ] Stress Test:    	  Home Medications:  aspirin 81 mg oral tablet, chewable: 1 tab(s) orally once a day (14 Apr 2021 00:37)  atorvastatin 80 mg oral tablet: 1 tab(s) orally once a day (14 Apr 2021 00:37)  iron polysaccharide (as elemental iron) 60 mg oral capsule: 1 tab(s) orally 2 times a day (14 Apr 2021 01:59)  metoprolol succinate 25 mg oral tablet, extended release: 1 tab(s) orally 2 times a day (14 Apr 2021 01:57)  ranolazine 500 mg oral tablet, extended release: 1 tab(s) orally 2 times a day (14 Apr 2021 01:58)  Vitamin D3 2000 intl units (50 mcg) oral tablet: 1 tab(s) orally 2 times a day (14 Apr 2021 01:58)    MEDICATIONS  (STANDING):  cefepime   IVPB 1000 milliGRAM(s) IV Intermittent every 12 hours  chlorhexidine 4% Liquid 1 Application(s) Topical <User Schedule>  fluconAZOLE IVPB 200 milliGRAM(s) IV Intermittent every 24 hours  metoprolol tartrate Injectable 5 milliGRAM(s) IV Push every 6 hours  metroNIDAZOLE  IVPB 500 milliGRAM(s) IV Intermittent every 8 hours  metroNIDAZOLE  IVPB      pantoprazole Infusion 8 mG/Hr (10 mL/Hr) IV Continuous <Continuous>  sodium chloride 0.9%. 1000 milliLiter(s) (100 mL/Hr) IV Continuous <Continuous>    MEDICATIONS  (PRN):  benzocaine 20% Spray 1 Spray(s) Topical four times a day PRN throat pain

## 2021-04-15 NOTE — CONSULT NOTE ADULT - ASSESSMENT
Assessment:  Pre-op cardiac evaluation for EGD  CAD s/p CABG  Gastric ulcer with concern for contained perforation  Anemia 2/2 GIB s/p prbc 2 units    Plan: Assessment:  Pre-op cardiac evaluation for EGD  CAD s/p CABG 10 years ago, no prior PCI  Gastric ulcer with concern for contained perforation  Anemia 2/2 GIB s/p prbc 2 units    Plan:  aspirin on hold 2/2 GIB, resume when clear by GI  c/w statin and beta-blocker  moderate cardiac risk patient for planned procedure EGD  keep Hb>8

## 2021-04-15 NOTE — PROGRESS NOTE ADULT - ASSESSMENT
gas80 Yo  M Hx of CAD , CKD admitted for acute/subacute anemia with outside evidence of FELICITY. CT revealed suspicion for a possible distal antral ulceration with associated thickening around with concerns for contained perforation vs additional ulcers.  DDx includes PUD (HP vs NSAID vs ideopathic) Or malignant ulcers- CANNOT rule out perf though clinically soft.    Rec:  PPI IV drip  IV Abx and antifugal  NPO  Trend CBC and transfuse  Consult nutrition for PPN or TPN  Check serum HP Ab  Would manage medically until can perform EGD safely (hemostasis goals and diagnostic) EGD on MONDAY

## 2021-04-15 NOTE — PROGRESS NOTE ADULT - SUBJECTIVE AND OBJECTIVE BOX
KELLY DELGADO  80y Male   [id number]    Hospital Day: 3  Post Operative Day:  Procedure:  Patient is a 80y old  Male who presents with a chief complaint of Contained perforated gastric ulcer (2021 20:32)    PAST MEDICAL & SURGICAL HISTORY:  CAD (coronary artery disease)    GERD (gastroesophageal reflux disease)        Events of the Last 24h:  Vital Signs Last 24 Hrs  T(C): 37.1 (2021 21:17), Max: 37.1 (2021 21:17)  T(F): 98.7 (2021 21:17), Max: 98.7 (2021 21:17)  HR: 76 (15 Apr 2021 00:25) (68 - 85)  BP: 142/64 (15 Apr 2021 00:25) (133/63 - 193/75)  BP(mean): --  RR: 18 (:17) (18 - 20)  SpO2: 93% (:17) (93% - 100%)            I&O's Summary    2021 07:01  -  15 Apr 2021 01:10  --------------------------------------------------------  IN: 1070 mL / OUT: 810 mL / NET: 260 mL     I&O's Detail    2021 07:01  -  15 Apr 2021 01:10  --------------------------------------------------------  IN:    IV PiggyBack: 100 mL    Pantoprazole: 70 mL    sodium chloride 0.9%: 900 mL  Total IN: 1070 mL    OUT:    Nasogastric/Oral tube (mL): 510 mL    Voided (mL): 300 mL  Total OUT: 810 mL    Total NET: 260 mL          MEDICATIONS  (STANDING):  cefepime   IVPB 1000 milliGRAM(s) IV Intermittent every 12 hours  chlorhexidine 4% Liquid 1 Application(s) Topical <User Schedule>  fluconAZOLE IVPB 200 milliGRAM(s) IV Intermittent every 24 hours  metoprolol tartrate Injectable 5 milliGRAM(s) IV Push every 6 hours  metroNIDAZOLE  IVPB 500 milliGRAM(s) IV Intermittent every 8 hours  metroNIDAZOLE  IVPB      pantoprazole Infusion 8 mG/Hr (10 mL/Hr) IV Continuous <Continuous>  sodium chloride 0.9%. 1000 milliLiter(s) (100 mL/Hr) IV Continuous <Continuous>    MEDICATIONS  (PRN):  benzocaine 20% Spray 1 Spray(s) Topical four times a day PRN throat pain      PHYSICAL EXAM:    GENERAL: NAD    HEENT: NCAT    CHEST/LUNGS: CTAB    HEART: RRR,  No murmurs, rubs, or gallops    ABDOMEN: SNTND +BS    EXTREMITIES:  FROM, No clubbing, cyanosis, or edema, palpable pulse    NEURO: No focal neurological deficits    SKIN: No rashes or lesions    INCISION/WOUNDS:                          7.5    6.66  )-----------( 211      ( 15 Apr 2021 00:23 )             23.4        CBC Full  -  ( 15 Apr 2021 00:23 )  WBC Count : 6.66 K/uL  RBC Count : 2.52 M/uL  Hemoglobin : 7.5 g/dL  Hematocrit : 23.4 %  Platelet Count - Automated : 211 K/uL  Mean Cell Volume : 92.9 fL  Mean Cell Hemoglobin : 29.8 pg  Mean Cell Hemoglobin Concentration : 32.1 g/dL  Auto Neutrophil # : x  Auto Lymphocyte # : x  Auto Monocyte # : x  Auto Eosinophil # : x  Auto Basophil # : x  Auto Neutrophil % : x  Auto Lymphocyte % : x  Auto Monocyte % : x  Auto Eosinophil % : x  Auto Basophil % : x               137   |  106   |  22                 Ca: 8.3    BMP:   ----------------------------< 95     M.8   (21 @ 09:11)             4.5    |  21    | 1.4                Ph: 3.1      LFT:     TPro: 5.7 / Alb: 3.6 / TBili: 0.4 / DBili: 0.2 / AST: 14 / ALT: 11 / AlkPhos: 90   (21 @ 15:56)    LIVER FUNCTIONS - ( 2021 15:56 )  Alb: 3.6 g/dL / Pro: 5.7 g/dL / ALK PHOS: 90 U/L / ALT: 11 U/L / AST: 14 U/L / GGT: x           PT/INR - ( 15 Apr 2021 00:23 )   PT: 13.90 sec;   INR: 1.21 ratio         PTT - ( 2021 09:11 )  PTT:30.3 sec  CARDIAC MARKERS ( 2021 16:33 )  x     / <0.01 ng/mL / x     / x     / x      CARDIAC MARKERS ( 2021 09:11 )  x     / <0.01 ng/mL / 141 U/L / x     / 3.6 ng/mL  CARDIAC MARKERS ( 2021 04:30 )  x     / x     / x     / x     / 4.0 ng/mL  CARDIAC MARKERS ( 2021 15:56 )  x     / <0.01 ng/mL / x     / x     / x          Urinalysis Basic - ( 2021 20:16 )    Color: Yellow / Appearance: Clear / S.033 / pH: x  Gluc: x / Ketone: Negative  / Bili: Negative / Urobili: <2 mg/dL   Blood: x / Protein: Trace / Nitrite: Negative   Leuk Esterase: Large / RBC: 1 /HPF / WBC 55 /HPF   Sq Epi: x / Non Sq Epi: 0 /HPF / Bacteria: Negative        Culture - Blood (collected 2021 17:22)  Source: .Blood Blood-Peripheral  Preliminary Report (2021 23:01):    No growth to date.      < from: CT Abdomen and Pelvis w/ Oral Cont (21 @ 22:09) >  IMPRESSION:    Thickening of the gastric wall in the region of the greater curvature as well as the region of the antrum, and thickening of the duodenal bulb and second portion of the duodenum are again noted. These findings are associated with stranding in the adjacent fat. Findings are consistent with gastritis and duodenitis. Despite the degree of thickening of the gastric and duodenal wall, the oral contrast material has passed into the proximal small bowel.    Previously noted small gas bubbles are again seen posterior to the posterior wall of the gastric antrum (3/25-26). Findings are consistent with a contained perforated ulcer into the region of the lesser sac with inflammatory changes but without a discrete fluid collection. There is no evidence of active extravasation of the oral contrast material. There is no evidence of pneumoperitoneum or free air elsewhere within the abdomen.      < end of copied text >

## 2021-04-15 NOTE — CONSULT NOTE ADULT - ATTENDING COMMENTS
pt who presents with anemia, abd pain, CT shows thickening of the stomach and evidence of possible perforated ulcer (contained). clinically pt's belly is soft. will need EGD once medically stable.
perforated and sealed gastric perforation   stable   NPO, NGT   continue Hydration   continue antibiotics   transfer to floor.
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 Steve.

## 2021-04-15 NOTE — PROGRESS NOTE ADULT - SUBJECTIVE AND OBJECTIVE BOX
Hepatology/GI follow up note: Pt seen and examined at bedside.     For questions and inquiries please page [phone].  For urgent matters or after 5pm and on weekends please page the fellow on call through the GI paging system.    80y Male seen for:    Subjective/Interval events:    Review of system  General:  (-) weight loss, (-) fevers  Eyes:  (-) visual changes  CV:  (-) chest pain  Resp: (-) SOB, (-) wheezing  GI: (-) abdominal pain,  (-) nausea, (-) vomiting, (-) dysphagia, (-) diarrhea, (-) constipation, (-) rectal bleeding, (-) melena, (-) hematemesis.  Neuro: (-) confusion, (-) weakness  Psych:  (-) Hallucinations  Heme:  (-) easy bruisability    Past medical/surgical Hx:  PAST MEDICAL & SURGICAL HISTORY:  CAD (coronary artery disease)    GERD (gastroesophageal reflux disease)      Home Medications:  Last Order Reconciliation Date: 21 @ 01:59 (Admission Reconciliation)  aspirin 81 mg oral tablet, chewable: 1 tab(s) orally once a day  atorvastatin 80 mg oral tablet: 1 tab(s) orally once a day  iron polysaccharide (as elemental iron) 60 mg oral capsule: 1 tab(s) orally 2 times a day  metoprolol succinate 25 mg oral tablet, extended release: 1 tab(s) orally 2 times a day  ranolazine 500 mg oral tablet, extended release: 1 tab(s) orally 2 times a day  Vitamin D3 2000 intl units (50 mcg) oral tablet: 1 tab(s) orally 2 times a day      Allergies:  No Known Allergies      Current Medications:   benzocaine 20% Spray 1 Spray(s) Topical four times a day PRN  cefepime   IVPB 1000 milliGRAM(s) IV Intermittent every 12 hours  chlorhexidine 4% Liquid 1 Application(s) Topical <User Schedule>  dextrose 5% + sodium chloride 0.45%. 1000 milliLiter(s) IV Continuous <Continuous>  fluconAZOLE IVPB 200 milliGRAM(s) IV Intermittent every 24 hours  metoprolol tartrate Injectable 5 milliGRAM(s) IV Push every 6 hours  metroNIDAZOLE  IVPB 500 milliGRAM(s) IV Intermittent every 8 hours  metroNIDAZOLE  IVPB      pantoprazole Infusion 8 mG/Hr IV Continuous <Continuous>        Physical exam:  T(C): 37.1 (04-15-21 @ 16:20), Max: 37.1 (21 @ 21:17)  HR: 73 (04-15-21 @ 16:20) (69 - 85)  BP: 143/60 (04-15-21 @ 16:20) (131/60 - 193/75)  RR: 18 (04-15-21 @ 16:20) (18 - 20)  SpO2: 96% (04-15-21 @ 16:20) (93% - 100%)    GENERAL: NAD  HEAD:  Atraumatic, Normocephalic  EYES: Sclera:NL  NECK: Supple, no JVD or thyromegaly  CHEST/LUNG: Good bilateral air entry  HEART: normal S1, S2. Regular  ABDOMEN: (-) distended, (-) tender, (-) rebound, (+) BS, (-)HSM  EXTREMITIES: (-) edema  NEUROLOGY: (-) asterixis  SKIN: (-) jaundice  NESTOR: (-) melena (-) brbpr      Data:                        7.5    6.66  )-----------( 211      ( 15 Apr 2021 00:23 )             23.4     MCV 92.9 (04-15-21)    RDW 19.6 (04-15-21)    HGB trend:  7.5  04-15-21 @ 00:23  7.9  21 @ 09:11  6.6  21 @ 15:56        WBC trend:  6.66  04-15-21 @ 00:23  9.28  21 @ 09:11  12.56  21 @ 15:56    04-15    137  |  108  |  18  ----------------------------<  78  4.1   |  18  |  1.4    Ca    8.0<L>      15 Apr 2021 00:23  Phos  4.3     04-15  Mg     1.9     04-15      Liver panel trend:  TBili 0.4   /   AST 14   /   ALT 11   /   AlkP 90   /   Tptn 5.7   /   Alb 3.6    /   DBili 0.2              PT/INR - ( 15 Apr 2021 00:23 )   PT: 13.90 sec;   INR: 1.21 ratio         PTT - ( 15 Apr 2021 00:23 )  PTT:29.8 sec    Culture - Urine (collected 2021 20:16)  Source: .Urine Clean Catch (Midstream)  Preliminary Report (15 Apr 2021 02:57):    >100,000 CFU/ml Enterococcus species    Culture - Blood (collected 2021 17:22)  Source: .Blood Blood-Peripheral  Preliminary Report (2021 23:01):    No growth to date.      Urinalysis Basic - ( 2021 20:16 )    Color: Yellow / Appearance: Clear / S.033 / pH: x  Gluc: x / Ketone: Negative  / Bili: Negative / Urobili: <2 mg/dL   Blood: x / Protein: Trace / Nitrite: Negative   Leuk Esterase: Large / RBC: 1 /HPF / WBC 55 /HPF   Sq Epi: x / Non Sq Epi: 0 /HPF / Bacteria: Negative

## 2021-04-16 LAB
-  AMPICILLIN: SIGNIFICANT CHANGE UP
-  CIPROFLOXACIN: SIGNIFICANT CHANGE UP
-  LEVOFLOXACIN: SIGNIFICANT CHANGE UP
-  NITROFURANTOIN: SIGNIFICANT CHANGE UP
-  TETRACYCLINE: SIGNIFICANT CHANGE UP
-  VANCOMYCIN: SIGNIFICANT CHANGE UP
ANION GAP SERPL CALC-SCNC: 14 MMOL/L — SIGNIFICANT CHANGE UP (ref 7–14)
BUN SERPL-MCNC: 13 MG/DL — SIGNIFICANT CHANGE UP (ref 10–20)
CALCIUM SERPL-MCNC: 8.2 MG/DL — LOW (ref 8.5–10.1)
CHLORIDE SERPL-SCNC: 104 MMOL/L — SIGNIFICANT CHANGE UP (ref 98–110)
CO2 SERPL-SCNC: 17 MMOL/L — SIGNIFICANT CHANGE UP (ref 17–32)
COVID-19 SPIKE DOMAIN AB INTERP: NEGATIVE — SIGNIFICANT CHANGE UP
COVID-19 SPIKE DOMAIN ANTIBODY RESULT: 0.4 U/ML — SIGNIFICANT CHANGE UP
CREAT SERPL-MCNC: 1.2 MG/DL — SIGNIFICANT CHANGE UP (ref 0.7–1.5)
CULTURE RESULTS: SIGNIFICANT CHANGE UP
GLUCOSE SERPL-MCNC: 107 MG/DL — HIGH (ref 70–99)
HCT VFR BLD CALC: 26.5 % — LOW (ref 42–52)
HGB BLD-MCNC: 8.4 G/DL — LOW (ref 14–18)
MCHC RBC-ENTMCNC: 29.6 PG — SIGNIFICANT CHANGE UP (ref 27–31)
MCHC RBC-ENTMCNC: 31.7 G/DL — LOW (ref 32–37)
MCV RBC AUTO: 93.3 FL — SIGNIFICANT CHANGE UP (ref 80–94)
METHOD TYPE: SIGNIFICANT CHANGE UP
NRBC # BLD: 0 /100 WBCS — SIGNIFICANT CHANGE UP (ref 0–0)
ORGANISM # SPEC MICROSCOPIC CNT: SIGNIFICANT CHANGE UP
ORGANISM # SPEC MICROSCOPIC CNT: SIGNIFICANT CHANGE UP
PLATELET # BLD AUTO: 244 K/UL — SIGNIFICANT CHANGE UP (ref 130–400)
POTASSIUM SERPL-MCNC: 3.9 MMOL/L — SIGNIFICANT CHANGE UP (ref 3.5–5)
POTASSIUM SERPL-SCNC: 3.9 MMOL/L — SIGNIFICANT CHANGE UP (ref 3.5–5)
RBC # BLD: 2.84 M/UL — LOW (ref 4.7–6.1)
RBC # FLD: 18.8 % — HIGH (ref 11.5–14.5)
SARS-COV-2 IGG+IGM SERPL QL IA: 0.4 U/ML — SIGNIFICANT CHANGE UP
SARS-COV-2 IGG+IGM SERPL QL IA: NEGATIVE — SIGNIFICANT CHANGE UP
SODIUM SERPL-SCNC: 135 MMOL/L — SIGNIFICANT CHANGE UP (ref 135–146)
SPECIMEN SOURCE: SIGNIFICANT CHANGE UP
TRIGL SERPL-MCNC: 56 MG/DL — SIGNIFICANT CHANGE UP
WBC # BLD: 5.23 K/UL — SIGNIFICANT CHANGE UP (ref 4.8–10.8)
WBC # FLD AUTO: 5.23 K/UL — SIGNIFICANT CHANGE UP (ref 4.8–10.8)

## 2021-04-16 PROCEDURE — 99222 1ST HOSP IP/OBS MODERATE 55: CPT

## 2021-04-16 PROCEDURE — 99233 SBSQ HOSP IP/OBS HIGH 50: CPT

## 2021-04-16 RX ORDER — ELECTROLYTE SOLUTION,INJ
1 VIAL (ML) INTRAVENOUS
Refills: 0 | Status: DISCONTINUED | OUTPATIENT
Start: 2021-04-16 | End: 2021-04-16

## 2021-04-16 RX ORDER — I.V. FAT EMULSION 20 G/100ML
1.25 EMULSION INTRAVENOUS
Qty: 100.05 | Refills: 0 | Status: DISCONTINUED | OUTPATIENT
Start: 2021-04-16 | End: 2021-04-16

## 2021-04-16 RX ADMIN — Medication 1 EACH: at 21:06

## 2021-04-16 RX ADMIN — Medication 5 MILLIGRAM(S): at 05:45

## 2021-04-16 RX ADMIN — Medication 100 MILLIGRAM(S): at 05:49

## 2021-04-16 RX ADMIN — CEFEPIME 100 MILLIGRAM(S): 1 INJECTION, POWDER, FOR SOLUTION INTRAMUSCULAR; INTRAVENOUS at 05:45

## 2021-04-16 RX ADMIN — I.V. FAT EMULSION 31.3 GM/KG/DAY: 20 EMULSION INTRAVENOUS at 21:06

## 2021-04-16 RX ADMIN — FLUCONAZOLE 100 MILLIGRAM(S): 150 TABLET ORAL at 05:45

## 2021-04-16 RX ADMIN — PANTOPRAZOLE SODIUM 10 MG/HR: 20 TABLET, DELAYED RELEASE ORAL at 18:54

## 2021-04-16 RX ADMIN — Medication 100 MILLIGRAM(S): at 21:06

## 2021-04-16 RX ADMIN — Medication 5 MILLIGRAM(S): at 14:11

## 2021-04-16 RX ADMIN — CEFEPIME 100 MILLIGRAM(S): 1 INJECTION, POWDER, FOR SOLUTION INTRAMUSCULAR; INTRAVENOUS at 18:22

## 2021-04-16 RX ADMIN — SODIUM CHLORIDE 100 MILLILITER(S): 9 INJECTION, SOLUTION INTRAVENOUS at 18:42

## 2021-04-16 RX ADMIN — Medication 5 MILLIGRAM(S): at 20:41

## 2021-04-16 RX ADMIN — Medication 100 MILLIGRAM(S): at 14:12

## 2021-04-16 NOTE — CHART NOTE - NSCHARTNOTEFT_GEN_A_CORE
NUTRITION SUPPORT CONSULTATION    HPI:  GENERAL SURGERY CONSULT NOTE    Patient: KELLY DELGADO , 80y ([birth year])Male   MRN: [id number]  Location: [facility] ED  Visit: 04-13-21 Emergency  Date: 04-14-21 @ 00:14    HPI:  79yo M, PMHx GERD, HTN, CAD, HLD, CKD3, CABGx4, presents for abdominal pain. Pt was sent from urgent care after BRBPR was found. Reports right sided intermittent abdominal pain x 1W with recent generalized weakness and increasing DIAZ. Pt denies history of colonoscopy. Most recent hgb per son was 8.9 on 3/29/21, and he was started on iron pills. Reports constipation contributing from the iron pills. Pt inconsistently takes Omeprazole due to concerns regarding renal side effects. He denies hematochieza, hematemesis, diarrhea, nausea, vomiting, CP, fever, and chills. He denies any smoking history.    PAST MEDICAL & SURGICAL HISTORY:  CAD (coronary artery disease)    GERD (gastroesophageal reflux disease)     (14 Apr 2021 00:10)      PAST MEDICAL & SURGICAL HISTORY:  CAD (coronary artery disease)    GERD (gastroesophageal reflux disease)        Allergies    No Known Allergies    Intolerances        MEDICATIONS  (STANDING):  cefepime   IVPB 1000 milliGRAM(s) IV Intermittent every 12 hours  chlorhexidine 4% Liquid 1 Application(s) Topical <User Schedule>  dextrose 5% + sodium chloride 0.45%. 1000 milliLiter(s) (100 mL/Hr) IV Continuous <Continuous>  fluconAZOLE IVPB 200 milliGRAM(s) IV Intermittent every 24 hours  metoprolol tartrate Injectable 5 milliGRAM(s) IV Push every 6 hours  metroNIDAZOLE  IVPB 500 milliGRAM(s) IV Intermittent every 8 hours  metroNIDAZOLE  IVPB      pantoprazole Infusion 8 mG/Hr (10 mL/Hr) IV Continuous <Continuous>    MEDICATIONS  (PRN):  benzocaine 20% Spray 1 Spray(s) Topical four times a day PRN throat pain      ICU Vital Signs Last 24 Hrs  T(C): 36.4 (16 Apr 2021 05:15), Max: 37.1 (15 Apr 2021 16:20)  T(F): 97.6 (16 Apr 2021 05:15), Max: 98.8 (15 Apr 2021 16:20)  HR: 56 (16 Apr 2021 05:15) (56 - 76)  BP: 147/74 (16 Apr 2021 05:15) (131/60 - 161/67)  BP(mean): 89 (15 Apr 2021 16:20) (89 - 89)  ABP: --  ABP(mean): --  RR: 18 (16 Apr 2021 05:15) (18 - 18)  SpO2: 96% (15 Apr 2021 16:20) (96% - 96%)      Drug Dosing Weight  Height (cm): 175.3 (15 Apr 2021 22:51)  Weight (kg): 80.3 (15 Apr 2021 22:51)  BMI (kg/m2): 26.1 (15 Apr 2021 22:51)  BSA (m2): 1.96 (15 Apr 2021 22:51)    EXAM  Awake, alert  Abd:  soft, ND  LE:   Enteral access:  IV access:    LABS  04-15    137  |  108  |  18  ----------------------------<  78  4.1   |  18  |  1.4    Ca    8.0<L>      15 Apr 2021 00:23  Phos  4.3     04-15  Mg     1.9     04-15                          7.5    6.66  )-----------( 211      ( 15 Apr 2021 00:23 )             23.4     Radiology:    Diet, NPO (04-15-21 @ 01:13)      ASSESSMENT        PLAN NUTRITION SUPPORT CONSULTATION    HPI:  GENERAL SURGERY CONSULT NOTE    Patient: KELLY DELGADO , 80y ([birth year])Male   MRN: [id number]  Location: [facility] ED  Visit: 04-13-21 Emergency  Date: 04-14-21 @ 00:14    HPI:  81yo M, PMHx GERD, HTN, CAD, HLD, CKD3, CABGx4, presents for abdominal pain. Pt was sent from urgent care after BRBPR was found. Reports right sided intermittent abdominal pain x 1W with recent generalized weakness and increasing DIAZ. Pt denies history of colonoscopy. Most recent hgb per son was 8.9 on 3/29/21, and he was started on iron pills. Reports constipation contributing from the iron pills. Pt inconsistently takes Omeprazole due to concerns regarding renal side effects. He denies hematochieza, hematemesis, diarrhea, nausea, vomiting, CP, fever, and chills. He denies any smoking history.    NST consult called to evaluate for parenteral nutrition. EGD planned for 4/19 and to remain NPO for now.    PAST MEDICAL & SURGICAL HISTORY:  CAD (coronary artery disease)  GERD (gastroesophageal reflux disease)    Allergies  No Known Allergies    MEDICATIONS  (STANDING):  cefepime   IVPB 1000 milliGRAM(s) IV Intermittent every 12 hours  chlorhexidine 4% Liquid 1 Application(s) Topical <User Schedule>  dextrose 5% + sodium chloride 0.45%. 1000 milliLiter(s) (100 mL/Hr) IV Continuous <Continuous>  fluconAZOLE IVPB 200 milliGRAM(s) IV Intermittent every 24 hours  metoprolol tartrate Injectable 5 milliGRAM(s) IV Push every 6 hours  metroNIDAZOLE  IVPB 500 milliGRAM(s) IV Intermittent every 8 hours  metroNIDAZOLE  IVPB      pantoprazole Infusion 8 mG/Hr (10 mL/Hr) IV Continuous <Continuous>    MEDICATIONS  (PRN):  benzocaine 20% Spray 1 Spray(s) Topical four times a day PRN throat pain    ICU Vital Signs Last 24 Hrs  T(C): 36.4 (16 Apr 2021 05:15), Max: 37.1 (15 Apr 2021 16:20)  T(F): 97.6 (16 Apr 2021 05:15), Max: 98.8 (15 Apr 2021 16:20)  HR: 56 (16 Apr 2021 05:15) (56 - 76)  BP: 147/74 (16 Apr 2021 05:15) (131/60 - 161/67)  BP(mean): 89 (15 Apr 2021 16:20) (89 - 89)  RR: 18 (16 Apr 2021 05:15) (18 - 18)  SpO2: 96% (15 Apr 2021 16:20) (96% - 96%)    Drug Dosing Weight  Height (cm): 175.3 (15 Apr 2021 22:51)  Weight (kg): 80.3 (15 Apr 2021 22:51)  BMI (kg/m2): 26.1 (15 Apr 2021 22:51)  BSA (m2): 1.96 (15 Apr 2021 22:51)    EXAM    Abd:   LE:   Enteral access: NG Garza Rt nostril, to suction  IV access: peripheral IV X2, Rt FA, Rt AC    LABS  04-15    137  |  108  |  18  ----------------------------<  78  4.1   |  18  |  1.4    Ca    8.0<L>      15 Apr 2021 00:23  Phos  4.3     04-15  Mg     1.9     04-15                        7.5    6.66  )-----------( 211      ( 15 Apr 2021 00:23 )             23.4     Radiology:  < from: CT Abdomen and Pelvis w/ Oral Cont (04.13.21 @ 22:09) >    EXAM:  CT ABDOMEN AND PELVIS OC          PROCEDURE DATE:  04/13/2021      INTERPRETATION:  CLINICAL STATEMENT: Abdominal pain; possible contained perf, f/u ct with po contrast    TECHNIQUE: Contiguous axial CT images were obtained from the lower chest to the pubic symphysis with oral contrast and without intravenous contrast.  Reformatted images in the coronal and sagittal planes were acquired.    COMPARISON CT: CT scan of the abdomen and pelvis dated 4/13/2021 5:53 PM    OTHER STUDIES USED FOR CORRELATION: None.    FINDINGS:    TUBES/LINES: Pacemaker lead is noted. An NG tube is in place extending to just distal to the EG junction.    LOWER CHEST: Status post post sternotomy. There are mild atelectatic changes at the lung bases. Nopleural or pericardial effusion.    HEPATIC: Unchanged.    BILIARY: No calcified gallstones are noted.    SPLEEN: Unremarkable.    PANCREAS: The pancreas is normal in size and configuration. No evidence of mass or pancreatitis. As described below, inflammatory changes are noted in the region of the lesser sac.    ADRENAL GLANDS: Unchanged    KIDNEYS: Unchanged    ABDOMINOPELVIC NODES: Unremarkable.    PELVIC ORGANS: Prostatic enlargement again noted. Contrast is present within the urinary bladderfrom the previous study. No evidence of pelvic lymphadenopathy or fluid collection.    PERITONEUM/MESENTERY/BOWEL: Thickening of the gastric wall in the region of the greater curvature as well as the region of the antrum, and thickening of the duodenal bulb and second portion of the duodenum are again noted. These findings are associated with stranding in the adjacent fat. Findings are consistent with gastritis and duodenitis. Despite the degree of thickening of the gastric and duodenal wall, theoral contrast material is passed into the proximal small bowel.    Previously noted small gas bubbles are again seen posterior to the posterior wall of the gastric antrum. Findings are consistent with a contained perforated ulcer into the region of the lesser sac with inflammatory changes but without a discrete fluid collection. There is no evidence of pneumoperitoneum or free air elsewhere within the abdomen.    No evidence of ascites within the abdomen or pelvis. The appendix is normal in appearance.    BONES/SOFT TISSUES:    OTHER: Calcification and mild ectasia of abdominal aorta (2.5 cm)    IMPRESSION:    Thickening of the gastric wall in the region of the greater curvature as well as the region of the antrum, and thickening of the duodenal bulb and second portion of the duodenum are again noted. These findings are associated with stranding in the adjacent fat. Findings are consistent with gastritis and duodenitis. Despite the degree of thickening of the gastric and duodenal wall, the oral contrast material has passed into the proximal small bowel.    Previously noted small gas bubbles are again seen posterior to the posterior wall of the gastric antrum (3/25-26). Findings are consistent with a contained perforated ulcer into the region of the lesser sac with inflammatory changes but without a discrete fluid collection. There is no evidence of active extravasation of the oral contrast material. There is no evidence of pneumoperitoneum or free air elsewhere within the abdomen.  < end of copied text >      Diet, NPO (04-15-21 @ 01:13)      ASSESSMENT  79 yo male Hx of CAD, CKD admitted for acute/subacute anemia with outside evidence of FELICITY. CT revealed suspicion for a possible distal antral ulceration with associated thickening around with concerns for contained perforation vs additional ulcers        PLAN  - please add TG level to this am's bloodwork  - start PPN tonight  - daily bmp, in phos, mg while on parenteral nutrition  - f/u egd results on 4/19, depending on results if anticipate prolonged NPO then will require central access for TPN NUTRITION SUPPORT CONSULTATION    HPI:  GENERAL SURGERY CONSULT NOTE    Patient: KELLY DELGADO , 80y ([birth year])Male   MRN: [id number]  Location: [facility] ED  Visit: 04-13-21 Emergency  Date: 04-14-21 @ 00:14    HPI:  79yo M, PMHx GERD, HTN, CAD, HLD, CKD3, CABGx4, presents for abdominal pain. Pt was sent from urgent care after BRBPR was found. Reports right sided intermittent abdominal pain x 1W with recent generalized weakness and increasing DIAZ. Pt denies history of colonoscopy. Most recent hgb per son was 8.9 on 3/29/21, and he was started on iron pills. Reports constipation contributing from the iron pills. Pt inconsistently takes Omeprazole due to concerns regarding renal side effects. He denies hematochieza, hematemesis, diarrhea, nausea, vomiting, CP, fever, and chills. He denies any smoking history.    NST consult called to evaluate for parenteral nutrition. EGD planned for 4/19 and to remain NPO for now.  Reports PO intake fair/varied over past 2 weeks PTA depending on if pain was present. Pt's son at beside and all ?'s answered re: starting PPN tonight    PAST MEDICAL & SURGICAL HISTORY:  CAD (coronary artery disease)  GERD (gastroesophageal reflux disease)    Allergies  No Known Allergies    MEDICATIONS  (STANDING):  cefepime   IVPB 1000 milliGRAM(s) IV Intermittent every 12 hours  chlorhexidine 4% Liquid 1 Application(s) Topical <User Schedule>  dextrose 5% + sodium chloride 0.45%. 1000 milliLiter(s) (100 mL/Hr) IV Continuous <Continuous>  fluconAZOLE IVPB 200 milliGRAM(s) IV Intermittent every 24 hours  metoprolol tartrate Injectable 5 milliGRAM(s) IV Push every 6 hours  metroNIDAZOLE  IVPB 500 milliGRAM(s) IV Intermittent every 8 hours  metroNIDAZOLE  IVPB      pantoprazole Infusion 8 mG/Hr (10 mL/Hr) IV Continuous <Continuous>    MEDICATIONS  (PRN):  benzocaine 20% Spray 1 Spray(s) Topical four times a day PRN throat pain    ICU Vital Signs Last 24 Hrs  T(C): 36.4 (16 Apr 2021 05:15), Max: 37.1 (15 Apr 2021 16:20)  T(F): 97.6 (16 Apr 2021 05:15), Max: 98.8 (15 Apr 2021 16:20)  HR: 56 (16 Apr 2021 05:15) (56 - 76)  BP: 147/74 (16 Apr 2021 05:15) (131/60 - 161/67)  BP(mean): 89 (15 Apr 2021 16:20) (89 - 89)  RR: 18 (16 Apr 2021 05:15) (18 - 18)  SpO2: 96% (15 Apr 2021 16:20) (96% - 96%)    Drug Dosing Weight  Height (cm): 175.3 (15 Apr 2021 22:51)  Weight (kg): 80.3 (15 Apr 2021 22:51)  BMI (kg/m2): 26.1 (15 Apr 2021 22:51)  BSA (m2): 1.96 (15 Apr 2021 22:51)    EXAM  A&O X 3  Appears well nourished, muscle mass appropriate for age  Abd: soft, ND  LE: no edema  Enteral access: NG Chester Rt nostril, to suction  IV access: peripheral IV X2, Rt FA, Rt AC    LABS  04-15    137  |  108  |  18  ----------------------------<  78  4.1   |  18  |  1.4    Ca    8.0<L>      15 Apr 2021 00:23  Phos  4.3     04-15  Mg     1.9     04-15                        7.5    6.66  )-----------( 211      ( 15 Apr 2021 00:23 )             23.4     Radiology:  < from: CT Abdomen and Pelvis w/ Oral Cont (04.13.21 @ 22:09) >    EXAM:  CT ABDOMEN AND PELVIS OC          PROCEDURE DATE:  04/13/2021      INTERPRETATION:  CLINICAL STATEMENT: Abdominal pain; possible contained perf, f/u ct with po contrast    TECHNIQUE: Contiguous axial CT images were obtained from the lower chest to the pubic symphysis with oral contrast and without intravenous contrast.  Reformatted images in the coronal and sagittal planes were acquired.    COMPARISON CT: CT scan of the abdomen and pelvis dated 4/13/2021 5:53 PM    OTHER STUDIES USED FOR CORRELATION: None.    FINDINGS:    TUBES/LINES: Pacemaker lead is noted. An NG tube is in place extending to just distal to the EG junction.    LOWER CHEST: Status post post sternotomy. There are mild atelectatic changes at the lung bases. Nopleural or pericardial effusion.    HEPATIC: Unchanged.    BILIARY: No calcified gallstones are noted.    SPLEEN: Unremarkable.    PANCREAS: The pancreas is normal in size and configuration. No evidence of mass or pancreatitis. As described below, inflammatory changes are noted in the region of the lesser sac.    ADRENAL GLANDS: Unchanged    KIDNEYS: Unchanged    ABDOMINOPELVIC NODES: Unremarkable.    PELVIC ORGANS: Prostatic enlargement again noted. Contrast is present within the urinary bladderfrom the previous study. No evidence of pelvic lymphadenopathy or fluid collection.    PERITONEUM/MESENTERY/BOWEL: Thickening of the gastric wall in the region of the greater curvature as well as the region of the antrum, and thickening of the duodenal bulb and second portion of the duodenum are again noted. These findings are associated with stranding in the adjacent fat. Findings are consistent with gastritis and duodenitis. Despite the degree of thickening of the gastric and duodenal wall, theoral contrast material is passed into the proximal small bowel.    Previously noted small gas bubbles are again seen posterior to the posterior wall of the gastric antrum. Findings are consistent with a contained perforated ulcer into the region of the lesser sac with inflammatory changes but without a discrete fluid collection. There is no evidence of pneumoperitoneum or free air elsewhere within the abdomen.    No evidence of ascites within the abdomen or pelvis. The appendix is normal in appearance.    BONES/SOFT TISSUES:    OTHER: Calcification and mild ectasia of abdominal aorta (2.5 cm)    IMPRESSION:    Thickening of the gastric wall in the region of the greater curvature as well as the region of the antrum, and thickening of the duodenal bulb and second portion of the duodenum are again noted. These findings are associated with stranding in the adjacent fat. Findings are consistent with gastritis and duodenitis. Despite the degree of thickening of the gastric and duodenal wall, the oral contrast material has passed into the proximal small bowel.    Previously noted small gas bubbles are again seen posterior to the posterior wall of the gastric antrum (3/25-26). Findings are consistent with a contained perforated ulcer into the region of the lesser sac with inflammatory changes but without a discrete fluid collection. There is no evidence of active extravasation of the oral contrast material. There is no evidence of pneumoperitoneum or free air elsewhere within the abdomen.  < end of copied text >      Diet, NPO (04-15-21 @ 01:13)    ASSESSMENT  79 yo male Hx of CAD, CKD admitted for acute/subacute anemia with outside evidence of FELICITY. CT revealed suspicion for a possible distal antral ulceration with associated thickening around with concerns for contained perforation vs additional ulcers      PLAN  - please add TG level to this am's bloodwork  - start PPN tonight  - daily bmp, in phos, mg while on parenteral nutrition  - f/u egd results on 4/19, depending on results if anticipate prolonged NPO then will require central access for TPN  - will follow

## 2021-04-16 NOTE — PROGRESS NOTE ADULT - SUBJECTIVE AND OBJECTIVE BOX
KELLY DELGADO  80y Male   [id number]    Hospital Day: 4  Post Operative Day:  Procedure:  Patient is a 80y old  Male who presents with a chief complaint of contained perforated gastric ulcer (15 Apr 2021 17:48)    PAST MEDICAL & SURGICAL HISTORY:  CAD (coronary artery disease)    GERD (gastroesophageal reflux disease)        Events of the Last 24h:  Vital Signs Last 24 Hrs  T(C): 36.2 (15 Apr 2021 22:51), Max: 37.1 (15 Apr 2021 16:20)  T(F): 97.2 (15 Apr 2021 22:51), Max: 98.8 (15 Apr 2021 16:20)  HR: 63 (15 Apr 2021 22:51) (62 - 76)  BP: 151/67 (15 Apr 2021 22:51) (131/60 - 161/67)  BP(mean): 89 (15 Apr 2021 16:20) (89 - 89)  RR: 18 (15 Apr 2021 22:51) (18 - 18)  SpO2: 96% (15 Apr 2021 16:20) (96% - 96%)        Diet, NPO (04-15-21 @ 01:13)      I&O's Summary    2021 07:  -  15 Apr 2021 07:00  --------------------------------------------------------  IN: 2200 mL / OUT: 1310 mL / NET: 890 mL    15 Apr 2021 07:  -  2021 01:37  --------------------------------------------------------  IN: 1670 mL / OUT: 900 mL / NET: 770 mL     I&O's Detail    2021 07:01  -  15 Apr 2021 07:00  --------------------------------------------------------  IN:    IV PiggyBack: 350 mL    Pantoprazole: 150 mL    sodium chloride 0.9%: 1700 mL  Total IN: 2200 mL    OUT:    Nasogastric/Oral tube (mL): 510 mL    Voided (mL): 800 mL  Total OUT: 1310 mL    Total NET: 890 mL      15 Apr 2021 07:01  -  2021 01:37  --------------------------------------------------------  IN:    dextrose 5% + sodium chloride 0.45%: 1000 mL    IV PiggyBack: 100 mL    Pantoprazole: 170 mL    sodium chloride 0.9%: 400 mL  Total IN: 1670 mL    OUT:    Nasogastric/Oral tube (mL): 100 mL    Voided (mL): 800 mL  Total OUT: 900 mL    Total NET: 770 mL          MEDICATIONS  (STANDING):  cefepime   IVPB 1000 milliGRAM(s) IV Intermittent every 12 hours  chlorhexidine 4% Liquid 1 Application(s) Topical <User Schedule>  dextrose 5% + sodium chloride 0.45%. 1000 milliLiter(s) (100 mL/Hr) IV Continuous <Continuous>  fluconAZOLE IVPB 200 milliGRAM(s) IV Intermittent every 24 hours  metoprolol tartrate Injectable 5 milliGRAM(s) IV Push every 6 hours  metroNIDAZOLE  IVPB 500 milliGRAM(s) IV Intermittent every 8 hours  metroNIDAZOLE  IVPB      pantoprazole Infusion 8 mG/Hr (10 mL/Hr) IV Continuous <Continuous>    MEDICATIONS  (PRN):  benzocaine 20% Spray 1 Spray(s) Topical four times a day PRN throat pain      PHYSICAL EXAM:    GENERAL: NAD    HEENT: NCAT    CHEST/LUNGS: CTAB    HEART: RRR,  No murmurs, rubs, or gallops    ABDOMEN: SNTND +BS    EXTREMITIES:  FROM, No clubbing, cyanosis, or edema, palpable pulse    NEURO: No focal neurological deficits    SKIN: No rashes or lesions    INCISION/WOUNDS:                          7.5    6.66  )-----------( 211      ( 15 Apr 2021 00:23 )             23.4        CBC Full  -  ( 15 Apr 2021 00:23 )  WBC Count : 6.66 K/uL  RBC Count : 2.52 M/uL  Hemoglobin : 7.5 g/dL  Hematocrit : 23.4 %  Platelet Count - Automated : 211 K/uL  Mean Cell Volume : 92.9 fL  Mean Cell Hemoglobin : 29.8 pg  Mean Cell Hemoglobin Concentration : 32.1 g/dL  Auto Neutrophil # : x  Auto Lymphocyte # : x  Auto Monocyte # : x  Auto Eosinophil # : x  Auto Basophil # : x  Auto Neutrophil % : x  Auto Lymphocyte % : x  Auto Monocyte % : x  Auto Eosinophil % : x  Auto Basophil % : x               137   |  108   |  18                 Ca: 8.0    BMP:   ----------------------------< 78     M.9   (04-15-21 @ 00:23)             4.1    |  18    | 1.4                Ph: 4.3      LFT:     TPro: 5.7 / Alb: 3.6 / TBili: 0.4 / DBili: 0.2 / AST: 14 / ALT: 11 / AlkPhos: 90   (21 @ 15:56)      PT/INR - ( 15 Apr 2021 00:23 )   PT: 13.90 sec;   INR: 1.21 ratio         PTT - ( 15 Apr 2021 00:23 )  PTT:29.8 sec  CARDIAC MARKERS ( 2021 16:33 )  x     / <0.01 ng/mL / x     / x     / x      CARDIAC MARKERS ( 2021 09:11 )  x     / <0.01 ng/mL / 141 U/L / x     / 3.6 ng/mL  CARDIAC MARKERS ( 2021 04:30 )  x     / x     / x     / x     / 4.0 ng/mL          Culture - Urine (collected 2021 20:16)  Source: .Urine Clean Catch (Midstream)  Preliminary Report (15 Apr 2021 02:57):    >100,000 CFU/ml Enterococcus species    Culture - Blood (collected 2021 17:22)  Source: .Blood Blood-Peripheral  Preliminary Report (2021 23:01):    No growth to date.      < from: Xray Chest 1 View- PORTABLE-Routine (Xray Chest 1 View- PORTABLE-Routine in AM.) (04.15.21 @ 06:47) >    IMPRESSION:    Advanced enteric tube.    No focal consolidation.    < end of copied text >

## 2021-04-16 NOTE — PROGRESS NOTE ADULT - ASSESSMENT
79 Yo  M Hx of CAD , CKD admitted for acute/subacute anemia with outside evidence of FELICITY. CT revealed suspicion for a possible distal antral ulceration with associated thickening around with concerns for contained perforation vs additional ulcers.  DDx includes PUD (HP vs NSAID vs ideopathic) Or malignant ulcers- CANNOT rule out perf though clinically soft.    Rec:  PPI IV drip  IV Abx and antifugal  NPO  Trend CBC and transfuse  PPN to be started today  EGD on Monday  Obtain updated COVID status

## 2021-04-16 NOTE — PROGRESS NOTE ADULT - SUBJECTIVE AND OBJECTIVE BOX
Hepatology/GI follow up note: Pt seen and examined at bedside.     For questions and inquiries please page [phone].  For urgent matters or after 5pm and on weekends please page the fellow on call through the GI paging system.    80y Male seen for: Contained gastric perforation     Subjective/Interval events:  Feels good  No complaints  Had solid DMs yesterday. No melena no BRBPR  No pain    Review of system  General:  (-) weight loss, (-) fevers  Eyes:  (-) visual changes  CV:  (-) chest pain  Resp: (-) SOB, (-) wheezing  GI: (-) abdominal pain,  (-) nausea, (-) vomiting, (-) dysphagia, (-) diarrhea, (-) constipation, (-) rectal bleeding, (-) melena, (-) hematemesis.  Neuro: (-) confusion, (-) weakness  Psych:  (-) Hallucinations  Heme:  (-) easy bruisability    Past medical/surgical Hx:  PAST MEDICAL & SURGICAL HISTORY:  CAD (coronary artery disease)    GERD (gastroesophageal reflux disease)      Home Medications:  Last Order Reconciliation Date: 04-14-21 @ 01:59 (Admission Reconciliation)  aspirin 81 mg oral tablet, chewable: 1 tab(s) orally once a day  atorvastatin 80 mg oral tablet: 1 tab(s) orally once a day  iron polysaccharide (as elemental iron) 60 mg oral capsule: 1 tab(s) orally 2 times a day  metoprolol succinate 25 mg oral tablet, extended release: 1 tab(s) orally 2 times a day  ranolazine 500 mg oral tablet, extended release: 1 tab(s) orally 2 times a day  Vitamin D3 2000 intl units (50 mcg) oral tablet: 1 tab(s) orally 2 times a day      Allergies:  No Known Allergies      Current Medications:   benzocaine 20% Spray 1 Spray(s) Topical four times a day PRN  cefepime   IVPB 1000 milliGRAM(s) IV Intermittent every 12 hours  chlorhexidine 4% Liquid 1 Application(s) Topical <User Schedule>  dextrose 5% + sodium chloride 0.45%. 1000 milliLiter(s) IV Continuous <Continuous>  fat emulsion (Fish Oil and Plant Based) 20% Infusion 1.246 Gm/kG/Day IV Continuous <Continuous>  fluconAZOLE IVPB 200 milliGRAM(s) IV Intermittent every 24 hours  metoprolol tartrate Injectable 5 milliGRAM(s) IV Push every 6 hours  metroNIDAZOLE  IVPB 500 milliGRAM(s) IV Intermittent every 8 hours  metroNIDAZOLE  IVPB      pantoprazole Infusion 8 mG/Hr IV Continuous <Continuous>  Parenteral Nutrition - Adult 1 Each TPN Continuous <Continuous>        Physical exam:  T(C): 36.6 (04-16-21 @ 14:20), Max: 36.8 (04-15-21 @ 20:00)  HR: 64 (04-16-21 @ 14:20) (56 - 72)  BP: 164/66 (04-16-21 @ 14:20) (147/74 - 164/66)  RR: 16 (04-16-21 @ 14:20) (16 - 18)  SpO2: 94% (04-16-21 @ 14:20) (94% - 94%)    GENERAL: NAD  HEAD:  Atraumatic, Normocephalic  EYES: Sclera:NL  NECK: Supple, no JVD or thyromegaly  CHEST/LUNG: Good bilateral air entry  HEART: normal S1, S2. Regular  ABDOMEN: (-) distended, (-) tender, (-) rebound, (+) BS, (-)HSM  EXTREMITIES: (-) edema  NEUROLOGY: (-) asterixis  SKIN: (-) jaundice        Data:                        8.4    5.23  )-----------( 244      ( 16 Apr 2021 09:09 )             26.5     MCV 93.3 (04-16-21)    RDW 18.8 (04-16-21)    HGB trend:  8.4  04-16-21 @ 09:09  7.5  04-15-21 @ 00:23  7.9  04-14-21 @ 09:11        WBC trend:  5.23  04-16-21 @ 09:09  6.66  04-15-21 @ 00:23  9.28  04-14-21 @ 09:11    04-16    135  |  104  |  13  ----------------------------<  107<H>  3.9   |  17  |  1.2    Ca    8.2<L>      16 Apr 2021 09:09  Phos  4.3     04-15  Mg     1.9     04-15      Liver panel trend:  TBili 0.4   /   AST 14   /   ALT 11   /   AlkP 90   /   Tptn 5.7   /   Alb 3.6    /   DBili 0.2      04-13        PT/INR - ( 15 Apr 2021 00:23 )   PT: 13.90 sec;   INR: 1.21 ratio         PTT - ( 15 Apr 2021 00:23 )  PTT:29.8 sec    Culture - Urine (collected 13 Apr 2021 20:16)  Source: .Urine Clean Catch (Midstream)  Final Report (16 Apr 2021 06:34):    >100,000 CFU/ml Enterococcus faecalis  Organism: Enterococcus faecalis (16 Apr 2021 06:34)  Organism: Enterococcus faecalis (16 Apr 2021 06:34)    Culture - Blood (collected 13 Apr 2021 17:22)  Source: .Blood Blood-Peripheral  Preliminary Report (14 Apr 2021 23:01):    No growth to date.

## 2021-04-17 LAB
ANION GAP SERPL CALC-SCNC: 12 MMOL/L — SIGNIFICANT CHANGE UP (ref 7–14)
BASOPHILS # BLD AUTO: 0 K/UL — SIGNIFICANT CHANGE UP (ref 0–0.2)
BASOPHILS NFR BLD AUTO: 0 % — SIGNIFICANT CHANGE UP (ref 0–1)
BUN SERPL-MCNC: 11 MG/DL — SIGNIFICANT CHANGE UP (ref 10–20)
CALCIUM SERPL-MCNC: 8.2 MG/DL — LOW (ref 8.5–10.1)
CHLORIDE SERPL-SCNC: 105 MMOL/L — SIGNIFICANT CHANGE UP (ref 98–110)
CO2 SERPL-SCNC: 20 MMOL/L — SIGNIFICANT CHANGE UP (ref 17–32)
CREAT SERPL-MCNC: 1.2 MG/DL — SIGNIFICANT CHANGE UP (ref 0.7–1.5)
EOSINOPHIL # BLD AUTO: 0.36 K/UL — SIGNIFICANT CHANGE UP (ref 0–0.7)
EOSINOPHIL NFR BLD AUTO: 7.3 % — SIGNIFICANT CHANGE UP (ref 0–8)
GLUCOSE BLDC GLUCOMTR-MCNC: 91 MG/DL — SIGNIFICANT CHANGE UP (ref 70–99)
GLUCOSE SERPL-MCNC: 105 MG/DL — HIGH (ref 70–99)
HCT VFR BLD CALC: 25.8 % — LOW (ref 42–52)
HGB BLD-MCNC: 8.2 G/DL — LOW (ref 14–18)
IMM GRANULOCYTES NFR BLD AUTO: 0.4 % — HIGH (ref 0.1–0.3)
LYMPHOCYTES # BLD AUTO: 0.16 K/UL — LOW (ref 1.2–3.4)
LYMPHOCYTES # BLD AUTO: 3.3 % — LOW (ref 20.5–51.1)
MAGNESIUM SERPL-MCNC: 1.8 MG/DL — SIGNIFICANT CHANGE UP (ref 1.8–2.4)
MCHC RBC-ENTMCNC: 29.2 PG — SIGNIFICANT CHANGE UP (ref 27–31)
MCHC RBC-ENTMCNC: 31.8 G/DL — LOW (ref 32–37)
MCV RBC AUTO: 91.8 FL — SIGNIFICANT CHANGE UP (ref 80–94)
MONOCYTES # BLD AUTO: 0.5 K/UL — SIGNIFICANT CHANGE UP (ref 0.1–0.6)
MONOCYTES NFR BLD AUTO: 10.2 % — HIGH (ref 1.7–9.3)
NEUTROPHILS # BLD AUTO: 3.87 K/UL — SIGNIFICANT CHANGE UP (ref 1.4–6.5)
NEUTROPHILS NFR BLD AUTO: 78.8 % — HIGH (ref 42.2–75.2)
NRBC # BLD: 0 /100 WBCS — SIGNIFICANT CHANGE UP (ref 0–0)
PHOSPHATE SERPL-MCNC: 3 MG/DL — SIGNIFICANT CHANGE UP (ref 2.1–4.9)
PLATELET # BLD AUTO: 254 K/UL — SIGNIFICANT CHANGE UP (ref 130–400)
POTASSIUM SERPL-MCNC: 3.9 MMOL/L — SIGNIFICANT CHANGE UP (ref 3.5–5)
POTASSIUM SERPL-SCNC: 3.9 MMOL/L — SIGNIFICANT CHANGE UP (ref 3.5–5)
RBC # BLD: 2.81 M/UL — LOW (ref 4.7–6.1)
RBC # FLD: 17.8 % — HIGH (ref 11.5–14.5)
SARS-COV-2 RNA SPEC QL NAA+PROBE: DETECTED
SARS-COV-2 RNA SPEC QL NAA+PROBE: SIGNIFICANT CHANGE UP
SODIUM SERPL-SCNC: 137 MMOL/L — SIGNIFICANT CHANGE UP (ref 135–146)
WBC # BLD: 4.91 K/UL — SIGNIFICANT CHANGE UP (ref 4.8–10.8)
WBC # FLD AUTO: 4.91 K/UL — SIGNIFICANT CHANGE UP (ref 4.8–10.8)

## 2021-04-17 RX ORDER — AMPICILLIN SODIUM AND SULBACTAM SODIUM 250; 125 MG/ML; MG/ML
INJECTION, POWDER, FOR SUSPENSION INTRAMUSCULAR; INTRAVENOUS
Refills: 0 | Status: DISCONTINUED | OUTPATIENT
Start: 2021-04-17 | End: 2021-04-21

## 2021-04-17 RX ORDER — AMPICILLIN SODIUM AND SULBACTAM SODIUM 250; 125 MG/ML; MG/ML
1.5 INJECTION, POWDER, FOR SUSPENSION INTRAMUSCULAR; INTRAVENOUS EVERY 6 HOURS
Refills: 0 | Status: DISCONTINUED | OUTPATIENT
Start: 2021-04-17 | End: 2021-04-21

## 2021-04-17 RX ORDER — AMPICILLIN SODIUM AND SULBACTAM SODIUM 250; 125 MG/ML; MG/ML
1.5 INJECTION, POWDER, FOR SUSPENSION INTRAMUSCULAR; INTRAVENOUS ONCE
Refills: 0 | Status: COMPLETED | OUTPATIENT
Start: 2021-04-17 | End: 2021-04-17

## 2021-04-17 RX ORDER — ELECTROLYTE SOLUTION,INJ
1 VIAL (ML) INTRAVENOUS
Refills: 0 | Status: DISCONTINUED | OUTPATIENT
Start: 2021-04-17 | End: 2021-04-17

## 2021-04-17 RX ORDER — I.V. FAT EMULSION 20 G/100ML
1.25 EMULSION INTRAVENOUS
Qty: 100.05 | Refills: 0 | Status: DISCONTINUED | OUTPATIENT
Start: 2021-04-17 | End: 2021-04-17

## 2021-04-17 RX ORDER — HEPARIN SODIUM 5000 [USP'U]/ML
5000 INJECTION INTRAVENOUS; SUBCUTANEOUS EVERY 8 HOURS
Refills: 0 | Status: DISCONTINUED | OUTPATIENT
Start: 2021-04-17 | End: 2021-04-26

## 2021-04-17 RX ADMIN — I.V. FAT EMULSION 31.3 GM/KG/DAY: 20 EMULSION INTRAVENOUS at 21:46

## 2021-04-17 RX ADMIN — Medication 5 MILLIGRAM(S): at 05:36

## 2021-04-17 RX ADMIN — FLUCONAZOLE 100 MILLIGRAM(S): 150 TABLET ORAL at 06:15

## 2021-04-17 RX ADMIN — Medication 100 MILLIGRAM(S): at 08:10

## 2021-04-17 RX ADMIN — Medication 5 MILLIGRAM(S): at 13:01

## 2021-04-17 RX ADMIN — HEPARIN SODIUM 5000 UNIT(S): 5000 INJECTION INTRAVENOUS; SUBCUTANEOUS at 21:46

## 2021-04-17 RX ADMIN — CEFEPIME 100 MILLIGRAM(S): 1 INJECTION, POWDER, FOR SOLUTION INTRAMUSCULAR; INTRAVENOUS at 05:29

## 2021-04-17 RX ADMIN — Medication 1 EACH: at 21:46

## 2021-04-17 RX ADMIN — HEPARIN SODIUM 5000 UNIT(S): 5000 INJECTION INTRAVENOUS; SUBCUTANEOUS at 13:02

## 2021-04-17 RX ADMIN — PANTOPRAZOLE SODIUM 10 MG/HR: 20 TABLET, DELAYED RELEASE ORAL at 21:47

## 2021-04-17 RX ADMIN — AMPICILLIN SODIUM AND SULBACTAM SODIUM 100 GRAM(S): 250; 125 INJECTION, POWDER, FOR SUSPENSION INTRAMUSCULAR; INTRAVENOUS at 13:06

## 2021-04-17 RX ADMIN — AMPICILLIN SODIUM AND SULBACTAM SODIUM 100 GRAM(S): 250; 125 INJECTION, POWDER, FOR SUSPENSION INTRAMUSCULAR; INTRAVENOUS at 18:27

## 2021-04-17 RX ADMIN — PANTOPRAZOLE SODIUM 10 MG/HR: 20 TABLET, DELAYED RELEASE ORAL at 10:21

## 2021-04-17 RX ADMIN — Medication 5 MILLIGRAM(S): at 18:28

## 2021-04-17 NOTE — CONSULT NOTE ADULT - SUBJECTIVE AND OBJECTIVE BOX
KELLY DELGADO  80y, Male  Allergy: No Known Allergies      CHIEF COMPLAINT:   contained perforated gastric ulcer (17 Apr 2021 00:23)      LOS  3d    HPI  HPI:  GENERAL SURGERY CONSULT NOTE    Patient: KELLY DELGADO , 80y ([birth year])Male   MRN: [id number]  Location: [facility] ED  Visit: 04-13-21 Emergency  Date: 04-14-21 @ 00:14    HPI:  81yo M, PMHx GERD, HTN, CAD, HLD, CKD3, CABGx4, presents for abdominal pain. Pt was sent from urgent care after BRBPR was found. Reports right sided intermittent abdominal pain x 1W with recent generalized weakness and increasing DIAZ. Pt denies history of colonoscopy. Most recent hgb per son was 8.9 on 3/29/21, and he was started on iron pills. Reports constipation contributing from the iron pills. Pt inconsistently takes Omeprazole due to concerns regarding renal side effects. He denies hematochieza, hematemesis, diarrhea, nausea, vomiting, CP, fever, and chills. He denies any smoking history.    PAST MEDICAL & SURGICAL HISTORY:  CAD (coronary artery disease)    GERD (gastroesophageal reflux disease)     (14 Apr 2021 00:10)      INFECTIOUS DISEASE HISTORY:  ID consulted for abx recommendations. No sepsis. found to have a contained perforation  UCX e.faecalis (S amp) UA WBC 55   BCX NGTD   Plan for EGD monday    PMH  PAST MEDICAL & SURGICAL HISTORY:  CAD (coronary artery disease)    GERD (gastroesophageal reflux disease)        FAMILY HISTORY  non-contributory     SOCIAL HISTORY  Social History:  Pt denies any current heavy ETOH use, IVDU. No recent travel outside the [place].       ROS  ***    VITALS:  T(F): 98.1, Max: 98.7 (04-16-21 @ 17:00)  HR: 70  BP: 155/70  RR: 18Vital Signs Last 24 Hrs  T(C): 36.7 (17 Apr 2021 05:14), Max: 37.1 (16 Apr 2021 17:00)  T(F): 98.1 (17 Apr 2021 05:14), Max: 98.7 (16 Apr 2021 17:00)  HR: 70 (17 Apr 2021 05:14) (64 - 72)  BP: 155/70 (17 Apr 2021 05:14) (145/70 - 169/76)  BP(mean): --  RR: 18 (17 Apr 2021 05:14) (16 - 18)  SpO2: 94% (16 Apr 2021 14:20) (94% - 94%)    PHYSICAL EXAM:  ***    TESTS & MEASUREMENTS:                        8.2    4.91  )-----------( 254      ( 17 Apr 2021 08:11 )             25.8     04-16    135  |  104  |  13  ----------------------------<  107<H>  3.9   |  17  |  1.2    Ca    8.2<L>      16 Apr 2021 09:09              Culture - Urine (collected 04-13-21 @ 20:16)  Source: .Urine Clean Catch (Midstream)  Final Report (04-16-21 @ 06:34):    >100,000 CFU/ml Enterococcus faecalis  Organism: Enterococcus faecalis (04-16-21 @ 06:34)  Organism: Enterococcus faecalis (04-16-21 @ 06:34)      -  Ampicillin: S <=2 Predicts results to ampicillin/sulbactam, amoxacillin-clavulanate and  piperacillin-tazobactam.      -  Ciprofloxacin: S <=1      -  Levofloxacin: S <=1      -  Nitrofurantoin: S <=32 Should not be used to treat pyelonephritis.      -  Tetra/Doxy: R >8      -  Vancomycin: S 2      Method Type: HEAVEN    Culture - Blood (collected 04-13-21 @ 17:22)  Source: .Blood Blood-Peripheral  Preliminary Report (04-14-21 @ 23:01):    No growth to date.        Lactate, Blood: 1.3 mmol/L (04-14-21 @ 04:30)  Blood Gas Venous - Lactate: 1.2 mmoL/L (04-13-21 @ 15:58)      INFECTIOUS DISEASES TESTING  COVID-19 PCR: NotDetec (04-13-21 @ 15:56)      INFLAMMATORY MARKERS      RADIOLOGY & ADDITIONAL TESTS:  I have personally reviewed the last Chest xray  CXR      CT      CARDIOLOGY TESTING  12 Lead ECG:   Ventricular Rate 78 BPM    Atrial Rate 78 BPM    P-R Interval 234 ms    QRS Duration 104 ms    Q-T Interval 374 ms    QTC Calculation(Bazett) 426 ms    P Axis 46 degrees    R Axis -61 degrees    T Axis 16 degrees    Diagnosis Line Sinus rhythm [id number] degree A-V block  Incomplete right bundle branch block  Left anterior fascicular block  Nonspecific ST and T wave abnormality  Abnormal ECG    Confirmed by Jameel Crump ([id number]) on 4/13/2021 6:33:05 PM (04-13-21 @ 16:34)      MEDICATIONS  cefepime   IVPB 1000 IV Intermittent every 12 hours  chlorhexidine 4% Liquid 1 Topical <User Schedule>  dextrose 5% + sodium chloride 0.45%. 1000 IV Continuous <Continuous>  fat emulsion (Fish Oil and Plant Based) 20% Infusion 1.246 IV Continuous <Continuous>  fluconAZOLE IVPB 200 IV Intermittent every 24 hours  heparin   Injectable 5000 SubCutaneous every 8 hours  metoprolol tartrate Injectable 5 IV Push every 6 hours  metroNIDAZOLE  IVPB 500 IV Intermittent every 8 hours  metroNIDAZOLE  IVPB     pantoprazole Infusion 8 IV Continuous <Continuous>  Parenteral Nutrition - Adult 1 TPN Continuous <Continuous>      Weight  Weight (kg): 80.3 (04-15-21 @ 22:51)    ANTIBIOTICS:  cefepime   IVPB 1000 milliGRAM(s) IV Intermittent every 12 hours  fluconAZOLE IVPB 200 milliGRAM(s) IV Intermittent every 24 hours  metroNIDAZOLE  IVPB 500 milliGRAM(s) IV Intermittent every 8 hours  metroNIDAZOLE  IVPB          ALLERGIES:  No Known Allergies       KELLY DELGADO  80y, Male  Allergy: No Known Allergies      CHIEF COMPLAINT:   contained perforated gastric ulcer (17 Apr 2021 00:23)      LOS  3d    HPI  HPI:  GENERAL SURGERY CONSULT NOTE    Patient: KELLY DELGADO , 80y ([birth year])Male   MRN: [id number]  Location: [facility] ED  Visit: 04-13-21 Emergency  Date: 04-14-21 @ 00:14    HPI:  81yo M, PMHx GERD, HTN, CAD, HLD, CKD3, CABGx4, presents for abdominal pain. Pt was sent from urgent care after BRBPR was found. Reports right sided intermittent abdominal pain x 1W with recent generalized weakness and increasing DIAZ. Pt denies history of colonoscopy. Most recent hgb per son was 8.9 on 3/29/21, and he was started on iron pills. Reports constipation contributing from the iron pills. Pt inconsistently takes Omeprazole due to concerns regarding renal side effects. He denies hematochieza, hematemesis, diarrhea, nausea, vomiting, CP, fever, and chills. He denies any smoking history.    PAST MEDICAL & SURGICAL HISTORY:  CAD (coronary artery disease)    GERD (gastroesophageal reflux disease)     (14 Apr 2021 00:10)      INFECTIOUS DISEASE HISTORY:  ID consulted for abx recommendations. No sepsis. found to have a contained perforation  UCX e.faecalis (S amp) UA WBC 55   BCX NGTD   Plan for EGD monday    PMH  PAST MEDICAL & SURGICAL HISTORY:  CAD (coronary artery disease)    GERD (gastroesophageal reflux disease)        FAMILY HISTORY  non-contributory     SOCIAL HISTORY  Social History:  Pt denies any current heavy ETOH use, IVDU. No recent travel outside the [place].       ROS  General: Denies rigors, nightsweats  HEENT: Denies headache, rhinorrhea, sore throat, eye pain  CV: Denies CP, palpitations  PULM: Denies wheezing, hemoptysis  GI: Denies hematemesis, hematochezia, melena  : Denies discharge, hematuria  MSK: Denies arthralgias, myalgias  SKIN: Denies rash, lesions  NEURO: Denies paresthesias, weakness  PSYCH: Denies depression, anxiety     VITALS:  T(F): 98.1, Max: 98.7 (04-16-21 @ 17:00)  HR: 70  BP: 155/70  RR: 18Vital Signs Last 24 Hrs  T(C): 36.7 (17 Apr 2021 05:14), Max: 37.1 (16 Apr 2021 17:00)  T(F): 98.1 (17 Apr 2021 05:14), Max: 98.7 (16 Apr 2021 17:00)  HR: 70 (17 Apr 2021 05:14) (64 - 72)  BP: 155/70 (17 Apr 2021 05:14) (145/70 - 169/76)  BP(mean): --  RR: 18 (17 Apr 2021 05:14) (16 - 18)  SpO2: 94% (16 Apr 2021 14:20) (94% - 94%)    PHYSICAL EXAM:  Gen: NAD, resting in bed  HEENT: Normocephalic, atraumatic NGT  Neck: supple, no lymphadenopathy  CV: Regular rate & regular rhythm  Lungs: decreased BS at bases, no fremitus  Abdomen: Soft, BS present nontender  Ext: Warm, well perfused  Neuro: non focal, awake  Skin: no rash, no erythema  Lines: no phlebitis     TESTS & MEASUREMENTS:                        8.2    4.91  )-----------( 254      ( 17 Apr 2021 08:11 )             25.8     04-16    135  |  104  |  13  ----------------------------<  107<H>  3.9   |  17  |  1.2    Ca    8.2<L>      16 Apr 2021 09:09              Culture - Urine (collected 04-13-21 @ 20:16)  Source: .Urine Clean Catch (Midstream)  Final Report (04-16-21 @ 06:34):    >100,000 CFU/ml Enterococcus faecalis  Organism: Enterococcus faecalis (04-16-21 @ 06:34)  Organism: Enterococcus faecalis (04-16-21 @ 06:34)      -  Ampicillin: S <=2 Predicts results to ampicillin/sulbactam, amoxacillin-clavulanate and  piperacillin-tazobactam.      -  Ciprofloxacin: S <=1      -  Levofloxacin: S <=1      -  Nitrofurantoin: S <=32 Should not be used to treat pyelonephritis.      -  Tetra/Doxy: R >8      -  Vancomycin: S 2      Method Type: HEAVEN    Culture - Blood (collected 04-13-21 @ 17:22)  Source: .Blood Blood-Peripheral  Preliminary Report (04-14-21 @ 23:01):    No growth to date.        Lactate, Blood: 1.3 mmol/L (04-14-21 @ 04:30)  Blood Gas Venous - Lactate: 1.2 mmoL/L (04-13-21 @ 15:58)      INFECTIOUS DISEASES TESTING  COVID-19 PCR: NotDetec (04-13-21 @ 15:56)      INFLAMMATORY MARKERS      RADIOLOGY & ADDITIONAL TESTS:  I have personally reviewed the last Chest xray  CXR      CT      CARDIOLOGY TESTING  12 Lead ECG:   Ventricular Rate 78 BPM    Atrial Rate 78 BPM    P-R Interval 234 ms    QRS Duration 104 ms    Q-T Interval 374 ms    QTC Calculation(Bazett) 426 ms    P Axis 46 degrees    R Axis -61 degrees    T Axis 16 degrees    Diagnosis Line Sinus rhythm nitx4ml degree A-V block  Incomplete right bundle branch block  Left anterior fascicular block  Nonspecific ST and T wave abnormality  Abnormal ECG    Confirmed by Jameel Crump ([id number]) on 4/13/2021 6:33:05 PM (04-13-21 @ 16:34)      MEDICATIONS  cefepime   IVPB 1000 IV Intermittent every 12 hours  chlorhexidine 4% Liquid 1 Topical <User Schedule>  dextrose 5% + sodium chloride 0.45%. 1000 IV Continuous <Continuous>  fat emulsion (Fish Oil and Plant Based) 20% Infusion 1.246 IV Continuous <Continuous>  fluconAZOLE IVPB 200 IV Intermittent every 24 hours  heparin   Injectable 5000 SubCutaneous every 8 hours  metoprolol tartrate Injectable 5 IV Push every 6 hours  metroNIDAZOLE  IVPB 500 IV Intermittent every 8 hours  metroNIDAZOLE  IVPB     pantoprazole Infusion 8 IV Continuous <Continuous>  Parenteral Nutrition - Adult 1 TPN Continuous <Continuous>      Weight  Weight (kg): 80.3 (04-15-21 @ 22:51)    ANTIBIOTICS:  cefepime   IVPB 1000 milliGRAM(s) IV Intermittent every 12 hours  fluconAZOLE IVPB 200 milliGRAM(s) IV Intermittent every 24 hours  metroNIDAZOLE  IVPB 500 milliGRAM(s) IV Intermittent every 8 hours  metroNIDAZOLE  IVPB          ALLERGIES:  No Known Allergies

## 2021-04-17 NOTE — CHART NOTE - NSCHARTNOTEFT_GEN_A_CORE
the patient tested positive for covid on 4/16/21  patient tested negative for covid on 4/17/21  patient and family are updated with the results   admintive A.D.N REQUIRS patient to be transferred to covid unit   family is refusing the patient to be transferred   senior resident spoke with both sides and everyone is aware with risks and benefits.  attending is aware

## 2021-04-17 NOTE — PROGRESS NOTE ADULT - SUBJECTIVE AND OBJECTIVE BOX
GENERAL SURGERY PROGRESS NOTE     KELLY DELGADO  80y  Male  Hospital day :3d  POD:  Procedure:   OVERNIGHT EVENTS: No acute overnight events. Started on PPN    T(F): 98.7 (21 @ 17:00), Max: 98.7 (21 @ 17:00)  HR: 68 (21 @ 17:00) (56 - 72)  BP: 169/76 (21 @ 23:06) (145/70 - 169/76)  RR: 18 (21 @ 17:00) (16 - 18)  SpO2: 94% (21 @ 14:20) (94% - 94%)    DIET/FLUIDS: dextrose 5% + sodium chloride 0.45%. 1000 milliLiter(s) IV Continuous <Continuous>  fat emulsion (Fish Oil and Plant Based) 20% Infusion 1.246 Gm/kG/Day IV Continuous <Continuous>  Parenteral Nutrition - Adult 1 Each TPN Continuous <Continuous>    N-15-21 @ 07:01  -  21 @ 07:00  --------------------------------------------------------  OUT: 100 mL     GI proph:  pantoprazole Infusion 8 mG/Hr IV Continuous <Continuous>    AC/ proph:   ABx: cefepime   IVPB 1000 milliGRAM(s) IV Intermittent every 12 hours  fluconAZOLE IVPB 200 milliGRAM(s) IV Intermittent every 24 hours  metroNIDAZOLE  IVPB 500 milliGRAM(s) IV Intermittent every 8 hours  metroNIDAZOLE  IVPB          PHYSICAL EXAM:  GENERAL: NAD, well-appearing  CHEST/LUNG: Clear to auscultation bilaterally  HEART: Regular rate and rhythm  ABDOMEN: Soft, Nontender, Nondistended;   EXTREMITIES:  No clubbing, cyanosis, or edema      LABS                       8.4    5.23  )-----------( 244      ( 2021 09:09 )             26.5         -    135  |  104  |  13  ----------------------------<  107<H>  3.9   |  17  |  1.2      Calcium, Total Serum: 8.2 mg/dL (21 @ 09:09)      LFTs:     Lactate, Blood: 1.3 mmol/L (21 @ 04:30)      Coags:        Serum Pro-Brain Natriuretic Peptide: 368 pg/mL (21 @ 15:56)      RADIOLOGY & ADDITIONAL TESTS:  N/A

## 2021-04-17 NOTE — PROGRESS NOTE ADULT - ASSESSMENT
80M w/ PMH of CKD 3 (baseline Cr ~1.7), HTN, HLD, GERD, known L kidney mass, pacemaker (non-functioning per pt) and CAD s/p 4-vessel CABG ([facility], 2011) who presented to the ED with 2 weeks of worsening abdominal pain found to have contained perforated gastric antrum ulcer.    Plan:  - IV Antibiotics  - Continue antifungals  - NPO   - continue NGT to suction   - plan for EGD Monday (will need updated COVID swab)  - continue PPI drip   - IVF   - continue PPN

## 2021-04-17 NOTE — CONSULT NOTE ADULT - ASSESSMENT
ASSESSMENT  79yo M, PMHx GERD, HTN, CAD, HLD, CKD3, CABGx4, presents for abdominal pain and BRBPR found to have a contained perforation    IMPRESSION  #Contained gastric perforation secondary to PUD  < from: CT Abdomen and Pelvis w/ Oral Cont (04.13.21 @ 22:09) >  Thickening of the gastric wall in the region of the greater curvature as well as the region of the antrum, and thickening of the duodenal bulb and second portion of the duodenum are again noted. These findings are associated with stranding in the adjacent fat. Findings are consistent with gastritis and duodenitis. Despite the degree of thickening of the gastric and duodenal wall, the oral contrast material has passed into the proximal small bowel.  Previously noted small gas bubbles are again seen posterior to the posterior wall of the gastric antrum (3/25-26). Findings are consistent with a contained perforated ulcer into the region of the lesser sac with inflammatory changes but without a discrete fluid collection. There is no evidence of active extravasation of the oral contrast material. There is no evidence of pneumoperitoneum or free air elsewhere within the abdomen.  #UCX e. faecalis UA WBC 55 ***  #CT atelectasis  #Sepsis ruled out on admission   Creatinine, Serum: 1.2 mg/dL (04.16.21 @ 09:09)      RECOMMENDATIONS  This is an incomplete consult note. All recommendations to follow after interview and examination of the patient.   - D/C cefepime/flagyl, start Unasyn 1.5 q6h IV  - As contained perforation, see no need for antifungal coverage  - Send H pylori stool Ag  - EGD Monday    If any questions, please call or send a message on [employer] Teams  Spectra [phone]     ASSESSMENT  81yo M, PMHx GERD, HTN, CAD, HLD, CKD3, CABGx4, presents for abdominal pain and BRBPR found to have a contained perforation    IMPRESSION  #Contained gastric perforation secondary to PUD  < from: CT Abdomen and Pelvis w/ Oral Cont (04.13.21 @ 22:09) >  Thickening of the gastric wall in the region of the greater curvature as well as the region of the antrum, and thickening of the duodenal bulb and second portion of the duodenum are again noted. These findings are associated with stranding in the adjacent fat. Findings are consistent with gastritis and duodenitis. Despite the degree of thickening of the gastric and duodenal wall, the oral contrast material has passed into the proximal small bowel.  Previously noted small gas bubbles are again seen posterior to the posterior wall of the gastric antrum (3/25-26). Findings are consistent with a contained perforated ulcer into the region of the lesser sac with inflammatory changes but without a discrete fluid collection. There is no evidence of active extravasation of the oral contrast material. There is no evidence of pneumoperitoneum or free air elsewhere within the abdomen.  #UCX e. faecalis UA WBC 55 ***  #CT atelectasis  #Sepsis ruled out on admission   Creatinine, Serum: 1.2 mg/dL (04.16.21 @ 09:09)      RECOMMENDATIONS  - D/C cefepime/flagyl, start Unasyn 1.5 q6h IV  - As contained perforation, see no need for antifungal coverage  - Send H pylori stool Ag  - EGD Monday    If any questions, please call or send a message on [employer] Teams  Spectra [phone]

## 2021-04-18 LAB
ANION GAP SERPL CALC-SCNC: 14 MMOL/L — SIGNIFICANT CHANGE UP (ref 7–14)
APTT BLD: 30 SEC — SIGNIFICANT CHANGE UP (ref 27–39.2)
BASOPHILS # BLD AUTO: 0.01 K/UL — SIGNIFICANT CHANGE UP (ref 0–0.2)
BASOPHILS # BLD AUTO: 0.01 K/UL — SIGNIFICANT CHANGE UP (ref 0–0.2)
BASOPHILS NFR BLD AUTO: 0.2 % — SIGNIFICANT CHANGE UP (ref 0–1)
BASOPHILS NFR BLD AUTO: 0.2 % — SIGNIFICANT CHANGE UP (ref 0–1)
BLD GP AB SCN SERPL QL: SIGNIFICANT CHANGE UP
BUN SERPL-MCNC: 14 MG/DL — SIGNIFICANT CHANGE UP (ref 10–20)
BUN SERPL-MCNC: 15 MG/DL — SIGNIFICANT CHANGE UP (ref 10–20)
CALCIUM SERPL-MCNC: 8.2 MG/DL — LOW (ref 8.5–10.1)
CALCIUM SERPL-MCNC: 8.5 MG/DL — SIGNIFICANT CHANGE UP (ref 8.5–10.1)
CHLORIDE SERPL-SCNC: 105 MMOL/L — SIGNIFICANT CHANGE UP (ref 98–110)
CO2 SERPL-SCNC: 20 MMOL/L — SIGNIFICANT CHANGE UP (ref 17–32)
CO2 SERPL-SCNC: 23 MMOL/L — SIGNIFICANT CHANGE UP (ref 17–32)
CREAT SERPL-MCNC: 1.1 MG/DL — SIGNIFICANT CHANGE UP (ref 0.7–1.5)
CREAT SERPL-MCNC: 1.2 MG/DL — SIGNIFICANT CHANGE UP (ref 0.7–1.5)
CULTURE RESULTS: SIGNIFICANT CHANGE UP
EOSINOPHIL # BLD AUTO: 0.41 K/UL — SIGNIFICANT CHANGE UP (ref 0–0.7)
EOSINOPHIL # BLD AUTO: 0.41 K/UL — SIGNIFICANT CHANGE UP (ref 0–0.7)
EOSINOPHIL NFR BLD AUTO: 6.9 % — SIGNIFICANT CHANGE UP (ref 0–8)
EOSINOPHIL NFR BLD AUTO: 7.3 % — SIGNIFICANT CHANGE UP (ref 0–8)
GLUCOSE SERPL-MCNC: 89 MG/DL — SIGNIFICANT CHANGE UP (ref 70–99)
GLUCOSE SERPL-MCNC: 98 MG/DL — SIGNIFICANT CHANGE UP (ref 70–99)
HCT VFR BLD CALC: 26.6 % — LOW (ref 42–52)
HCT VFR BLD CALC: 26.9 % — LOW (ref 42–52)
HGB BLD-MCNC: 8.5 G/DL — LOW (ref 14–18)
HGB BLD-MCNC: 8.5 G/DL — LOW (ref 14–18)
IMM GRANULOCYTES NFR BLD AUTO: 0.4 % — HIGH (ref 0.1–0.3)
IMM GRANULOCYTES NFR BLD AUTO: 0.5 % — HIGH (ref 0.1–0.3)
INR BLD: 1.2 RATIO — SIGNIFICANT CHANGE UP (ref 0.65–1.3)
LYMPHOCYTES # BLD AUTO: 0.25 K/UL — LOW (ref 1.2–3.4)
LYMPHOCYTES # BLD AUTO: 0.34 K/UL — LOW (ref 1.2–3.4)
LYMPHOCYTES # BLD AUTO: 4.5 % — LOW (ref 20.5–51.1)
LYMPHOCYTES # BLD AUTO: 5.7 % — LOW (ref 20.5–51.1)
MAGNESIUM SERPL-MCNC: 2 MG/DL — SIGNIFICANT CHANGE UP (ref 1.8–2.4)
MAGNESIUM SERPL-MCNC: 2.1 MG/DL — SIGNIFICANT CHANGE UP (ref 1.8–2.4)
MCHC RBC-ENTMCNC: 29.1 PG — SIGNIFICANT CHANGE UP (ref 27–31)
MCHC RBC-ENTMCNC: 29.3 PG — SIGNIFICANT CHANGE UP (ref 27–31)
MCHC RBC-ENTMCNC: 31.6 G/DL — LOW (ref 32–37)
MCHC RBC-ENTMCNC: 32 G/DL — SIGNIFICANT CHANGE UP (ref 32–37)
MCV RBC AUTO: 91.7 FL — SIGNIFICANT CHANGE UP (ref 80–94)
MCV RBC AUTO: 92.1 FL — SIGNIFICANT CHANGE UP (ref 80–94)
MONOCYTES # BLD AUTO: 0.65 K/UL — HIGH (ref 0.1–0.6)
MONOCYTES # BLD AUTO: 0.68 K/UL — HIGH (ref 0.1–0.6)
MONOCYTES NFR BLD AUTO: 11.4 % — HIGH (ref 1.7–9.3)
MONOCYTES NFR BLD AUTO: 11.6 % — HIGH (ref 1.7–9.3)
NEUTROPHILS # BLD AUTO: 4.24 K/UL — SIGNIFICANT CHANGE UP (ref 1.4–6.5)
NEUTROPHILS # BLD AUTO: 4.51 K/UL — SIGNIFICANT CHANGE UP (ref 1.4–6.5)
NEUTROPHILS NFR BLD AUTO: 75.3 % — HIGH (ref 42.2–75.2)
NEUTROPHILS NFR BLD AUTO: 76 % — HIGH (ref 42.2–75.2)
NRBC # BLD: 0 /100 WBCS — SIGNIFICANT CHANGE UP (ref 0–0)
NRBC # BLD: 0 /100 WBCS — SIGNIFICANT CHANGE UP (ref 0–0)
PHOSPHATE SERPL-MCNC: 2.8 MG/DL — SIGNIFICANT CHANGE UP (ref 2.1–4.9)
PHOSPHATE SERPL-MCNC: 3.2 MG/DL — SIGNIFICANT CHANGE UP (ref 2.1–4.9)
PLATELET # BLD AUTO: 253 K/UL — SIGNIFICANT CHANGE UP (ref 130–400)
PLATELET # BLD AUTO: 260 K/UL — SIGNIFICANT CHANGE UP (ref 130–400)
POTASSIUM SERPL-MCNC: 3.8 MMOL/L — SIGNIFICANT CHANGE UP (ref 3.5–5)
POTASSIUM SERPL-SCNC: 3.8 MMOL/L — SIGNIFICANT CHANGE UP (ref 3.5–5)
PROTHROM AB SERPL-ACNC: 13.8 SEC — HIGH (ref 9.95–12.87)
RBC # BLD: 2.9 M/UL — LOW (ref 4.7–6.1)
RBC # BLD: 2.92 M/UL — LOW (ref 4.7–6.1)
RBC # FLD: 17.4 % — HIGH (ref 11.5–14.5)
RBC # FLD: 17.7 % — HIGH (ref 11.5–14.5)
SODIUM SERPL-SCNC: 139 MMOL/L — SIGNIFICANT CHANGE UP (ref 135–146)
SPECIMEN SOURCE: SIGNIFICANT CHANGE UP
WBC # BLD: 5.58 K/UL — SIGNIFICANT CHANGE UP (ref 4.8–10.8)
WBC # BLD: 5.98 K/UL — SIGNIFICANT CHANGE UP (ref 4.8–10.8)
WBC # FLD AUTO: 5.58 K/UL — SIGNIFICANT CHANGE UP (ref 4.8–10.8)
WBC # FLD AUTO: 5.98 K/UL — SIGNIFICANT CHANGE UP (ref 4.8–10.8)

## 2021-04-18 RX ORDER — ELECTROLYTE SOLUTION,INJ
1 VIAL (ML) INTRAVENOUS
Refills: 0 | Status: DISCONTINUED | OUTPATIENT
Start: 2021-04-18 | End: 2021-04-18

## 2021-04-18 RX ORDER — I.V. FAT EMULSION 20 G/100ML
1.25 EMULSION INTRAVENOUS
Qty: 100.05 | Refills: 0 | Status: DISCONTINUED | OUTPATIENT
Start: 2021-04-18 | End: 2021-04-18

## 2021-04-18 RX ADMIN — HEPARIN SODIUM 5000 UNIT(S): 5000 INJECTION INTRAVENOUS; SUBCUTANEOUS at 06:00

## 2021-04-18 RX ADMIN — AMPICILLIN SODIUM AND SULBACTAM SODIUM 100 GRAM(S): 250; 125 INJECTION, POWDER, FOR SUSPENSION INTRAMUSCULAR; INTRAVENOUS at 17:32

## 2021-04-18 RX ADMIN — CHLORHEXIDINE GLUCONATE 1 APPLICATION(S): 213 SOLUTION TOPICAL at 06:00

## 2021-04-18 RX ADMIN — HEPARIN SODIUM 5000 UNIT(S): 5000 INJECTION INTRAVENOUS; SUBCUTANEOUS at 21:01

## 2021-04-18 RX ADMIN — AMPICILLIN SODIUM AND SULBACTAM SODIUM 100 GRAM(S): 250; 125 INJECTION, POWDER, FOR SUSPENSION INTRAMUSCULAR; INTRAVENOUS at 06:00

## 2021-04-18 RX ADMIN — AMPICILLIN SODIUM AND SULBACTAM SODIUM 100 GRAM(S): 250; 125 INJECTION, POWDER, FOR SUSPENSION INTRAMUSCULAR; INTRAVENOUS at 11:28

## 2021-04-18 RX ADMIN — Medication 5 MILLIGRAM(S): at 17:31

## 2021-04-18 RX ADMIN — PANTOPRAZOLE SODIUM 10 MG/HR: 20 TABLET, DELAYED RELEASE ORAL at 22:05

## 2021-04-18 RX ADMIN — Medication 5 MILLIGRAM(S): at 23:35

## 2021-04-18 RX ADMIN — Medication 5 MILLIGRAM(S): at 00:21

## 2021-04-18 RX ADMIN — Medication 1 EACH: at 21:01

## 2021-04-18 RX ADMIN — Medication 5 MILLIGRAM(S): at 05:59

## 2021-04-18 RX ADMIN — AMPICILLIN SODIUM AND SULBACTAM SODIUM 100 GRAM(S): 250; 125 INJECTION, POWDER, FOR SUSPENSION INTRAMUSCULAR; INTRAVENOUS at 00:42

## 2021-04-18 RX ADMIN — HEPARIN SODIUM 5000 UNIT(S): 5000 INJECTION INTRAVENOUS; SUBCUTANEOUS at 13:31

## 2021-04-18 RX ADMIN — I.V. FAT EMULSION 31.3 GM/KG/DAY: 20 EMULSION INTRAVENOUS at 21:00

## 2021-04-18 RX ADMIN — Medication 5 MILLIGRAM(S): at 11:27

## 2021-04-18 RX ADMIN — AMPICILLIN SODIUM AND SULBACTAM SODIUM 100 GRAM(S): 250; 125 INJECTION, POWDER, FOR SUSPENSION INTRAMUSCULAR; INTRAVENOUS at 23:35

## 2021-04-18 RX ADMIN — PANTOPRAZOLE SODIUM 10 MG/HR: 20 TABLET, DELAYED RELEASE ORAL at 11:39

## 2021-04-18 NOTE — PROGRESS NOTE ADULT - ASSESSMENT
80M w/ PMH of CKD 3 (baseline Cr ~1.7), HTN, HLD, GERD, known L kidney mass, pacemaker (non-functioning per pt) and CAD s/p 4-vessel CABG ([facility], 2011) who presented to the ED with 2 weeks of worsening abdominal pain found to have contained perforated gastric antrum ulcer.    Plan:  - IV Antibiotics  - Continue antifungals  - NPO   - continue NGT to suction   - plan for EGD Monday >> fu covid swab results situation   - continue PPI drip   - IVF   - continue PPN

## 2021-04-18 NOTE — PROGRESS NOTE ADULT - SUBJECTIVE AND OBJECTIVE BOX
GENERAL SURGERY PROGRESS NOTE     KELLY DELGADO  80y  Male  Hospital day :4d  POD:  Procedure:   OVERNIGHT EVENTS:    T(F): 98 (21 @ 06:18), Max: 98.7 (21 @ 12:50)  HR: 81 (21 @ 06:18) (62 - 81)  BP: 152/- (21 @ 06:18) (144/65 - 176/77)  ABP: --  ABP(mean): --  RR: 18 (21 @ 06:18) (18 - 18)  SpO2: 97% (21 @ 06:18) (97% - 100%)    DIET/FLUIDS: fat emulsion (Fish Oil and Plant Based) 20% Infusion 1.246 Gm/kG/Day IV Continuous <Continuous>  Parenteral Nutrition - Adult 1 Each TPN Continuous <Continuous>    N21 @ 07:01  -  21 @ 07:00  --------------------------------------------------------  OUT: 250 mL                                                                                 DRAINS:     BM:     EMESIS:     URINE:      GI proph:  pantoprazole Infusion 8 mG/Hr IV Continuous <Continuous>    AC/ proph: heparin   Injectable 5000 Unit(s) SubCutaneous every 8 hours    ABx: ampicillin/sulbactam  IVPB      ampicillin/sulbactam  IVPB 1.5 Gram(s) IV Intermittent every 6 hours      PHYSICAL EXAM:  GENERAL: NAD, well-appearing  CHEST/LUNG: Clear to auscultation bilaterally  HEART: Regular rate and rhythm  ABDOMEN: Soft, Nontender, Nondistended;   EXTREMITIES:  No clubbing, cyanosis, or edema      LABS  Labs:  CAPILLARY BLOOD GLUCOSE      POCT Blood Glucose.: 91 mg/dL (2021 23:01)                          8.2    4.91  )-----------( 254      ( 2021 08:11 )             25.8       Auto Neutrophil %: 78.8 % (21 @ 08:11)  Auto Immature Granulocyte %: 0.4 % (21 @ 08:11)        137  |  105  |  11  ----------------------------<  105<H>  3.9   |  20  |  1.2      Calcium, Total Serum: 8.2 mg/dL (21 @ 08:11)      LFTs:         Coags:        Serum Pro-Brain Natriuretic Peptide: 368 pg/mL (21 @ 15:56)              RADIOLOGY & ADDITIONAL TESTS:      A/P

## 2021-04-19 LAB
ANION GAP SERPL CALC-SCNC: 10 MMOL/L — SIGNIFICANT CHANGE UP (ref 7–14)
ANION GAP SERPL CALC-SCNC: 10 MMOL/L — SIGNIFICANT CHANGE UP (ref 7–14)
ANION GAP SERPL CALC-SCNC: 14 MMOL/L — SIGNIFICANT CHANGE UP (ref 7–14)
BASOPHILS # BLD AUTO: 0.01 K/UL — SIGNIFICANT CHANGE UP (ref 0–0.2)
BASOPHILS # BLD AUTO: 0.01 K/UL — SIGNIFICANT CHANGE UP (ref 0–0.2)
BASOPHILS NFR BLD AUTO: 0.2 % — SIGNIFICANT CHANGE UP (ref 0–1)
BASOPHILS NFR BLD AUTO: 0.2 % — SIGNIFICANT CHANGE UP (ref 0–1)
BUN SERPL-MCNC: 15 MG/DL — SIGNIFICANT CHANGE UP (ref 10–20)
BUN SERPL-MCNC: 17 MG/DL — SIGNIFICANT CHANGE UP (ref 10–20)
CALCIUM SERPL-MCNC: 8.3 MG/DL — LOW (ref 8.5–10.1)
CALCIUM SERPL-MCNC: 8.6 MG/DL — SIGNIFICANT CHANGE UP (ref 8.5–10.1)
CHLORIDE SERPL-SCNC: 103 MMOL/L — SIGNIFICANT CHANGE UP (ref 98–110)
CHLORIDE SERPL-SCNC: 104 MMOL/L — SIGNIFICANT CHANGE UP (ref 98–110)
CHLORIDE SERPL-SCNC: 106 MMOL/L — SIGNIFICANT CHANGE UP (ref 98–110)
CO2 SERPL-SCNC: 22 MMOL/L — SIGNIFICANT CHANGE UP (ref 17–32)
CO2 SERPL-SCNC: 24 MMOL/L — SIGNIFICANT CHANGE UP (ref 17–32)
CREAT SERPL-MCNC: 1.1 MG/DL — SIGNIFICANT CHANGE UP (ref 0.7–1.5)
CREAT SERPL-MCNC: 1.1 MG/DL — SIGNIFICANT CHANGE UP (ref 0.7–1.5)
EOSINOPHIL # BLD AUTO: 0.41 K/UL — SIGNIFICANT CHANGE UP (ref 0–0.7)
EOSINOPHIL # BLD AUTO: 0.41 K/UL — SIGNIFICANT CHANGE UP (ref 0–0.7)
EOSINOPHIL NFR BLD AUTO: 6.6 % — SIGNIFICANT CHANGE UP (ref 0–8)
EOSINOPHIL NFR BLD AUTO: 7.7 % — SIGNIFICANT CHANGE UP (ref 0–8)
GLUCOSE SERPL-MCNC: 88 MG/DL — SIGNIFICANT CHANGE UP (ref 70–99)
GLUCOSE SERPL-MCNC: 89 MG/DL — SIGNIFICANT CHANGE UP (ref 70–99)
HCT VFR BLD CALC: 28.6 % — LOW (ref 42–52)
HCT VFR BLD CALC: 28.7 % — LOW (ref 42–52)
HGB BLD-MCNC: 9 G/DL — LOW (ref 14–18)
HGB BLD-MCNC: 9 G/DL — LOW (ref 14–18)
IMM GRANULOCYTES NFR BLD AUTO: 0.4 % — HIGH (ref 0.1–0.3)
IMM GRANULOCYTES NFR BLD AUTO: 0.5 % — HIGH (ref 0.1–0.3)
LYMPHOCYTES # BLD AUTO: 0.19 K/UL — LOW (ref 1.2–3.4)
LYMPHOCYTES # BLD AUTO: 0.35 K/UL — LOW (ref 1.2–3.4)
LYMPHOCYTES # BLD AUTO: 3.1 % — LOW (ref 20.5–51.1)
LYMPHOCYTES # BLD AUTO: 6.5 % — LOW (ref 20.5–51.1)
MAGNESIUM SERPL-MCNC: 2.2 MG/DL — SIGNIFICANT CHANGE UP (ref 1.8–2.4)
MAGNESIUM SERPL-MCNC: 2.2 MG/DL — SIGNIFICANT CHANGE UP (ref 1.8–2.4)
MCHC RBC-ENTMCNC: 29 PG — SIGNIFICANT CHANGE UP (ref 27–31)
MCHC RBC-ENTMCNC: 29.1 PG — SIGNIFICANT CHANGE UP (ref 27–31)
MCHC RBC-ENTMCNC: 31.4 G/DL — LOW (ref 32–37)
MCHC RBC-ENTMCNC: 31.5 G/DL — LOW (ref 32–37)
MCV RBC AUTO: 92.6 FL — SIGNIFICANT CHANGE UP (ref 80–94)
MCV RBC AUTO: 92.6 FL — SIGNIFICANT CHANGE UP (ref 80–94)
MONOCYTES # BLD AUTO: 0.59 K/UL — SIGNIFICANT CHANGE UP (ref 0.1–0.6)
MONOCYTES # BLD AUTO: 0.69 K/UL — HIGH (ref 0.1–0.6)
MONOCYTES NFR BLD AUTO: 12.9 % — HIGH (ref 1.7–9.3)
MONOCYTES NFR BLD AUTO: 9.5 % — HIGH (ref 1.7–9.3)
NEUTROPHILS # BLD AUTO: 3.87 K/UL — SIGNIFICANT CHANGE UP (ref 1.4–6.5)
NEUTROPHILS # BLD AUTO: 4.99 K/UL — SIGNIFICANT CHANGE UP (ref 1.4–6.5)
NEUTROPHILS NFR BLD AUTO: 72.3 % — SIGNIFICANT CHANGE UP (ref 42.2–75.2)
NEUTROPHILS NFR BLD AUTO: 80.1 % — HIGH (ref 42.2–75.2)
NRBC # BLD: 0 /100 WBCS — SIGNIFICANT CHANGE UP (ref 0–0)
NRBC # BLD: 0 /100 WBCS — SIGNIFICANT CHANGE UP (ref 0–0)
PHOSPHATE SERPL-MCNC: 2.9 MG/DL — SIGNIFICANT CHANGE UP (ref 2.1–4.9)
PHOSPHATE SERPL-MCNC: 3.2 MG/DL — SIGNIFICANT CHANGE UP (ref 2.1–4.9)
PLATELET # BLD AUTO: 265 K/UL — SIGNIFICANT CHANGE UP (ref 130–400)
PLATELET # BLD AUTO: 286 K/UL — SIGNIFICANT CHANGE UP (ref 130–400)
POTASSIUM SERPL-MCNC: 4.2 MMOL/L — SIGNIFICANT CHANGE UP (ref 3.5–5)
POTASSIUM SERPL-MCNC: 4.5 MMOL/L — SIGNIFICANT CHANGE UP (ref 3.5–5)
POTASSIUM SERPL-MCNC: 5.6 MMOL/L — HIGH (ref 3.5–5)
POTASSIUM SERPL-SCNC: 4.2 MMOL/L — SIGNIFICANT CHANGE UP (ref 3.5–5)
POTASSIUM SERPL-SCNC: 4.5 MMOL/L — SIGNIFICANT CHANGE UP (ref 3.5–5)
POTASSIUM SERPL-SCNC: 5.6 MMOL/L — HIGH (ref 3.5–5)
RBC # BLD: 3.09 M/UL — LOW (ref 4.7–6.1)
RBC # BLD: 3.1 M/UL — LOW (ref 4.7–6.1)
RBC # FLD: 17.2 % — HIGH (ref 11.5–14.5)
RBC # FLD: 17.3 % — HIGH (ref 11.5–14.5)
SODIUM SERPL-SCNC: 137 MMOL/L — SIGNIFICANT CHANGE UP (ref 135–146)
SODIUM SERPL-SCNC: 139 MMOL/L — SIGNIFICANT CHANGE UP (ref 135–146)
SODIUM SERPL-SCNC: 140 MMOL/L — SIGNIFICANT CHANGE UP (ref 135–146)
WBC # BLD: 5.35 K/UL — SIGNIFICANT CHANGE UP (ref 4.8–10.8)
WBC # BLD: 6.22 K/UL — SIGNIFICANT CHANGE UP (ref 4.8–10.8)
WBC # FLD AUTO: 5.35 K/UL — SIGNIFICANT CHANGE UP (ref 4.8–10.8)
WBC # FLD AUTO: 6.22 K/UL — SIGNIFICANT CHANGE UP (ref 4.8–10.8)

## 2021-04-19 PROCEDURE — 88305 TISSUE EXAM BY PATHOLOGIST: CPT | Mod: 26

## 2021-04-19 PROCEDURE — 93010 ELECTROCARDIOGRAM REPORT: CPT

## 2021-04-19 PROCEDURE — 43239 EGD BIOPSY SINGLE/MULTIPLE: CPT

## 2021-04-19 PROCEDURE — 88312 SPECIAL STAINS GROUP 1: CPT | Mod: 26

## 2021-04-19 PROCEDURE — 88341 IMHCHEM/IMCYTCHM EA ADD ANTB: CPT | Mod: 26

## 2021-04-19 PROCEDURE — 88313 SPECIAL STAINS GROUP 2: CPT | Mod: 26

## 2021-04-19 PROCEDURE — 88342 IMHCHEM/IMCYTCHM 1ST ANTB: CPT | Mod: 26

## 2021-04-19 RX ORDER — LABETALOL HCL 100 MG
10 TABLET ORAL ONCE
Refills: 0 | Status: COMPLETED | OUTPATIENT
Start: 2021-04-19 | End: 2021-04-19

## 2021-04-19 RX ORDER — I.V. FAT EMULSION 20 G/100ML
1.25 EMULSION INTRAVENOUS
Qty: 100.05 | Refills: 0 | Status: DISCONTINUED | OUTPATIENT
Start: 2021-04-19 | End: 2021-04-19

## 2021-04-19 RX ORDER — ELECTROLYTE SOLUTION,INJ
1 VIAL (ML) INTRAVENOUS
Refills: 0 | Status: DISCONTINUED | OUTPATIENT
Start: 2021-04-19 | End: 2021-04-19

## 2021-04-19 RX ORDER — METOPROLOL TARTRATE 50 MG
5 TABLET ORAL ONCE
Refills: 0 | Status: COMPLETED | OUTPATIENT
Start: 2021-04-19 | End: 2021-04-19

## 2021-04-19 RX ADMIN — Medication 5 MILLIGRAM(S): at 05:49

## 2021-04-19 RX ADMIN — PANTOPRAZOLE SODIUM 10 MG/HR: 20 TABLET, DELAYED RELEASE ORAL at 18:30

## 2021-04-19 RX ADMIN — AMPICILLIN SODIUM AND SULBACTAM SODIUM 100 GRAM(S): 250; 125 INJECTION, POWDER, FOR SUSPENSION INTRAMUSCULAR; INTRAVENOUS at 23:35

## 2021-04-19 RX ADMIN — Medication 5 MILLIGRAM(S): at 23:36

## 2021-04-19 RX ADMIN — HEPARIN SODIUM 5000 UNIT(S): 5000 INJECTION INTRAVENOUS; SUBCUTANEOUS at 05:49

## 2021-04-19 RX ADMIN — AMPICILLIN SODIUM AND SULBACTAM SODIUM 100 GRAM(S): 250; 125 INJECTION, POWDER, FOR SUSPENSION INTRAMUSCULAR; INTRAVENOUS at 05:49

## 2021-04-19 RX ADMIN — AMPICILLIN SODIUM AND SULBACTAM SODIUM 100 GRAM(S): 250; 125 INJECTION, POWDER, FOR SUSPENSION INTRAMUSCULAR; INTRAVENOUS at 18:32

## 2021-04-19 RX ADMIN — CHLORHEXIDINE GLUCONATE 1 APPLICATION(S): 213 SOLUTION TOPICAL at 05:50

## 2021-04-19 RX ADMIN — Medication 5 MILLIGRAM(S): at 08:36

## 2021-04-19 RX ADMIN — AMPICILLIN SODIUM AND SULBACTAM SODIUM 100 GRAM(S): 250; 125 INJECTION, POWDER, FOR SUSPENSION INTRAMUSCULAR; INTRAVENOUS at 11:21

## 2021-04-19 RX ADMIN — Medication 1 EACH: at 21:08

## 2021-04-19 RX ADMIN — PANTOPRAZOLE SODIUM 10 MG/HR: 20 TABLET, DELAYED RELEASE ORAL at 08:00

## 2021-04-19 RX ADMIN — Medication 10 MILLIGRAM(S): at 11:07

## 2021-04-19 RX ADMIN — I.V. FAT EMULSION 31.3 GM/KG/DAY: 20 EMULSION INTRAVENOUS at 21:09

## 2021-04-19 RX ADMIN — HEPARIN SODIUM 5000 UNIT(S): 5000 INJECTION INTRAVENOUS; SUBCUTANEOUS at 21:09

## 2021-04-19 NOTE — PHYSICAL THERAPY INITIAL EVALUATION ADULT - GAIT TRAINING, PT EVAL
Pt. will be able to amb at least 150 ft using a SC Ind by D/C. Pt. will ascend and descend at least 10 steps using 1 HR with Sup by D/C

## 2021-04-19 NOTE — PROGRESS NOTE ADULT - SUBJECTIVE AND OBJECTIVE BOX
HPI:  GENERAL SURGERY CONSULT NOTE    Patient: KELLY DELGADO , 80y ([birth year])Male   MRN: [id number]  Location: [facility] ED  Visit: 04-13-21 Emergency  Date: 04-14-21 @ 00:14    HPI:  79yo M, PMHx GERD, HTN, CAD, HLD, CKD3, CABGx4, presents for abdominal pain. Pt was sent from urgent care after BRBPR was found. Reports right sided intermittent abdominal pain x 1W with recent generalized weakness and increasing DIAZ. Pt denies history of colonoscopy. Most recent hgb per son was 8.9 on 3/29/21, and he was started on iron pills. Reports constipation contributing from the iron pills. Pt inconsistently takes Omeprazole due to concerns regarding renal side effects. He denies hematochieza, hematemesis, diarrhea, nausea, vomiting, CP, fever, and chills. He denies any smoking history.    PAST MEDICAL & SURGICAL HISTORY:  CAD (coronary artery disease)    GERD (gastroesophageal reflux disease)     (14 Apr 2021 00:10)      ROS: Pt denies fever, chills, SOB, palpitations, nausea, vomiting, abdominal pain, dysuria, diarrhea, constipation, rash, muscle or joint pain.    Overnight events: No acute events overnight.    Vital Signs Last 24 Hrs  T(C): 36.8 (19 Apr 2021 18:21), Max: 37 (18 Apr 2021 20:46)  T(F): 98.2 (19 Apr 2021 18:21), Max: 98.6 (18 Apr 2021 20:46)  HR: 72 (19 Apr 2021 18:21) (65 - 78)  BP: 166/71 (19 Apr 2021 18:21) (89/51 - 188/79)  BP(mean): --  RR: 18 (19 Apr 2021 14:53) (11 - 18)  SpO2: 100% (19 Apr 2021 14:53) (95% - 100%)    MEDICATIONS  (STANDING):  ampicillin/sulbactam  IVPB 1.5 Gram(s) IV Intermittent every 6 hours  ampicillin/sulbactam  IVPB      chlorhexidine 4% Liquid 1 Application(s) Topical <User Schedule>  fat emulsion (Fish Oil and Plant Based) 20% Infusion 1.246 Gm/kG/Day (31.3 mL/Hr) IV Continuous <Continuous>  heparin   Injectable 5000 Unit(s) SubCutaneous every 8 hours  metoprolol tartrate Injectable 5 milliGRAM(s) IV Push every 6 hours  pantoprazole Infusion 8 mG/Hr (10 mL/Hr) IV Continuous <Continuous>  Parenteral Nutrition - Adult 1 Each (80 mL/Hr) TPN Continuous <Continuous>  Parenteral Nutrition - Adult 1 Each (80 mL/Hr) TPN Continuous <Continuous>    MEDICATIONS  (PRN):  benzocaine 20% Spray 1 Spray(s) Topical four times a day PRN throat pain      General Appearance: NAD, normal for age and gender.   Neck: Normal JVP, no bruit.   Eyes: No xanthomalasia, Extra Ocular muscles intact.   Cardiovascular: Regular rate and rhythm S1 S2, No JVD. Diastolic murmur LLSB.  Respiratory: Lungs clear to auscultation. No wheezes, rales or rhonchi.  Psychiatry: Alert and oriented x 3, Mood & affect appropriate  Gastrointestinal:  Soft, Non-tender  Skin/Integumen: No rashes, No ecchymoses, No cyanosis	  Neurologic: Non-focal deficits.  Musculoskeletal/ extremities: Normal range of motion, No clubbing, cyanosis or edema. (+) B/L venous stasis.  Vascular: Peripheral pulses palpable bilaterally      LABS:                        9.0    6.22  )-----------( 286      ( 19 Apr 2021 07:36 )             28.6     04-19    140  |  104  |  15  ----------------------------<  88  4.2   |  22  |  1.1    Ca    8.6      19 Apr 2021 07:36  Phos  2.9     04-19  Mg     2.2     04-19      PT/INR - ( 18 Apr 2021 22:36 )   PT: 13.80 sec;   INR: 1.20 ratio    PTT - ( 18 Apr 2021 22:36 )  PTT:30.0 sec           HPI:  GENERAL SURGERY CONSULT NOTE    Patient: KELLY DELGADO , 80y ([birth year])Male   MRN: [id number]  Location: [facility] ED  Visit: 04-13-21 Emergency  Date: 04-14-21 @ 00:14      HPI:  81yo M, PMHx GERD, HTN, CAD, HLD, CKD3, CABGx4, presents for abdominal pain. Pt was sent from urgent care after BRBPR was found. Reports right sided intermittent abdominal pain x 1W with recent generalized weakness and increasing DIAZ. Pt denies history of colonoscopy. Most recent hgb per son was 8.9 on 3/29/21, and he was started on iron pills. Reports constipation contributing from the iron pills. Pt inconsistently takes Omeprazole due to concerns regarding renal side effects. He denies hematochieza, hematemesis, diarrhea, nausea, vomiting, CP, fever, and chills. He denies any smoking history.    PAST MEDICAL & SURGICAL HISTORY:  CAD (coronary artery disease)    GERD (gastroesophageal reflux disease)     (14 Apr 2021 00:10)      ROS: Pt denies fever, chills, SOB, palpitations, nausea, vomiting, abdominal pain, dysuria, diarrhea, constipation, rash, muscle or joint pain.    Overnight events: No acute events overnight.    Vital Signs Last 24 Hrs  T(C): 36.8 (19 Apr 2021 18:21), Max: 37 (18 Apr 2021 20:46)  T(F): 98.2 (19 Apr 2021 18:21), Max: 98.6 (18 Apr 2021 20:46)  HR: 72 (19 Apr 2021 18:21) (65 - 78)  BP: 166/71 (19 Apr 2021 18:21) (89/51 - 188/79)  BP(mean): --  RR: 18 (19 Apr 2021 14:53) (11 - 18)  SpO2: 100% (19 Apr 2021 14:53) (95% - 100%)    MEDICATIONS  (STANDING):  ampicillin/sulbactam  IVPB 1.5 Gram(s) IV Intermittent every 6 hours  ampicillin/sulbactam  IVPB      chlorhexidine 4% Liquid 1 Application(s) Topical <User Schedule>  fat emulsion (Fish Oil and Plant Based) 20% Infusion 1.246 Gm/kG/Day (31.3 mL/Hr) IV Continuous <Continuous>  heparin   Injectable 5000 Unit(s) SubCutaneous every 8 hours  metoprolol tartrate Injectable 5 milliGRAM(s) IV Push every 6 hours  pantoprazole Infusion 8 mG/Hr (10 mL/Hr) IV Continuous <Continuous>  Parenteral Nutrition - Adult 1 Each (80 mL/Hr) TPN Continuous <Continuous>  Parenteral Nutrition - Adult 1 Each (80 mL/Hr) TPN Continuous <Continuous>    MEDICATIONS  (PRN):  benzocaine 20% Spray 1 Spray(s) Topical four times a day PRN throat pain      General Appearance: NAD, normal for age and gender.   Neck: Normal JVP, no bruit.   Eyes: No xanthomalasia, Extra Ocular muscles intact.   Cardiovascular: Regular rate and rhythm S1 S2, No JVD. Diastolic murmur LLSB.  Respiratory: Lungs clear to auscultation. No wheezes, rales or rhonchi.  Psychiatry: Alert and oriented x 3, Mood & affect appropriate  Gastrointestinal:  Soft, Non-tender  Skin/Integumen: No rashes, No ecchymoses, No cyanosis	  Neurologic: Non-focal deficits.  Musculoskeletal/ extremities: Normal range of motion, No clubbing, cyanosis or edema. (+) B/L venous stasis.  Vascular: Peripheral pulses palpable bilaterally      LABS:                        9.0    6.22  )-----------( 286      ( 19 Apr 2021 07:36 )             28.6     04-19    140  |  104  |  15  ----------------------------<  88  4.2   |  22  |  1.1    Ca    8.6      19 Apr 2021 07:36  Phos  2.9     04-19  Mg     2.2     04-19      PT/INR - ( 18 Apr 2021 22:36 )   PT: 13.80 sec;   INR: 1.20 ratio    PTT - ( 18 Apr 2021 22:36 )  PTT:30.0 sec

## 2021-04-19 NOTE — PHYSICAL THERAPY INITIAL EVALUATION ADULT - PERTINENT HX OF CURRENT PROBLEM, REHAB EVAL
79yo M, PMHx GERD, HTN, CAD, HLD, CKD3, CABGx4, presents for abdominal pain. Pt was sent from urgent care after BRBPR was found. Reports right sided intermittent abdominal pain x 1W with recent generalized weakness and increasing DIAZ. Pt denies history of colonoscopy. Most recent hgb per son was 8.9 on 3/29/21, and he was started on iron pills. Reports constipation contributing from the iron pills. Pt inconsistently takes Omeprazole due to concerns regarding renal side effects.

## 2021-04-19 NOTE — PROGRESS NOTE ADULT - ASSESSMENT
Assessment:  HTN, elevated  CAD s/p CABG 10 years ago, no prior PCI  Gastric ulcer with concern for contained perforation  Anemia s/p 2 units prbc, Hb stable  Currently completely NPO on parenteral nutrition    Plan:  aspirin on hold 2/2 GIB, resume when clear by GI  c/w statin and beta-blocker     Assessment:  HTN, elevated, pt denies symptoms  CAD s/p CABG 10 years ago, no prior PCI  Gastric ulcer with concern for contained perforation  Anemia s/p 2 units prbc, Hb stable  Currently completely NPO on PPN  Euvolemic on exam    Plan:  aspirin on hold 2/2 GIB, resume when clear by GI  c/w statin and beta-blocker  c/w lopressor IV pushes for now  if remains strictly NPO, can add IV hydralazine 10 mg q6h prn (as needed) for better BP control  Otherwise, pt's BP has improved since yesterday

## 2021-04-19 NOTE — CONSULT NOTE ADULT - REASON FOR ADMISSION
Contained perforated gastric ulcer

## 2021-04-19 NOTE — PHYSICAL THERAPY INITIAL EVALUATION ADULT - IMPAIRMENTS CONTRIBUTING TO GAIT DEVIATIONS, PT EVAL
Pt. presents with unsteadiness at times, required VCs and TCs for safety and steadying/impaired balance/decreased strength

## 2021-04-19 NOTE — PRE-OP CHECKLIST - PATIENT PROBLEMS/NEEDS
Per grandmother Bhavna did not have prescription for patient's guanfacine and vyvanse. Call placed to Bhavna who stated they had both prescriptions and were ready to be picked up. Informed grandmother that prescriptions are ready for .   Patient expressed no known problems or needs

## 2021-04-19 NOTE — PROGRESS NOTE ADULT - ASSESSMENT
Assessment:  80M w/ PMH of CKD 3 (baseline Cr ~1.7), HTN, HLD, GERD, known L kidney mass, pacemaker (non-functioning per pt) and CAD s/p 4-vessel CABG ([facility], 2011) who presented to the ED with 2 weeks of worsening abdominal pain found to have contained perforated gastric antrum ulcer.      Plan:  - IV Antibiotics  - Continue antifungals  - NPO   - continue NGT to suction   - plan for EGD today keeping nPO  - continue PPI drip   - IVF   - continue PPN  - Monitor vitals  - Monitor labs and replete as necessary  - Monitor for bowel function  - Continue Pain Medications if necessary  - Continue Antibiotics if necessary  - Encourage ambulation as tolerated  - Monitor urine output  - DVT and GI Prophylaxis  - Follow PT/Physiatry if necessary  - Contact social work/case management for necessary patient needs and dispo planning  - plan discussed with son at length over the phone yesterday     Date/Time: 04-19-21 @ 04:07

## 2021-04-19 NOTE — PHYSICAL THERAPY INITIAL EVALUATION ADULT - ADDITIONAL COMMENTS
Pt. reports he was fully independent PTA and did not use an AD. Pt. reports he is currently living with his daughter in a basement apartment with ~8 CARRIE.

## 2021-04-19 NOTE — CONSULT NOTE ADULT - SUBJECTIVE AND OBJECTIVE BOX
HPI:  GENERAL SURGERY CONSULT NOTE    Patient: KELLY DELGADO , 80y ([birth year])Male   MRN: [id number]  Location: [facility] ED  Visit: 04-13-21 Emergency  Date: 04-14-21 @ 00:14    HPI:  79yo M, PMHx GERD, HTN, CAD, HLD, CKD3, CABGx4, presents for abdominal pain. Pt was sent from urgent care after BRBPR was found. Reports right sided intermittent abdominal pain x 1W with recent generalized weakness and increasing DIAZ. Pt denies history of colonoscopy. Most recent hgb per son was 8.9 on 3/29/21, and he was started on iron pills. Reports constipation contributing from the iron pills. Pt inconsistently takes Omeprazole due to concerns regarding renal side effects. He denies hematochieza, hematemesis, diarrhea, nausea, vomiting, CP, fever, and chills. He denies any smoking history. Covid + on 4/16    PAST MEDICAL & SURGICAL HISTORY:  CAD (coronary artery disease)    GERD (gastroesophageal reflux disease)     (14 Apr 2021 00:10)      PAST MEDICAL & SURGICAL HISTORY:  CAD (coronary artery disease)    GERD (gastroesophageal reflux disease)            MEDICATIONS  (STANDING):  ampicillin/sulbactam  IVPB      ampicillin/sulbactam  IVPB 1.5 Gram(s) IV Intermittent every 6 hours  chlorhexidine 4% Liquid 1 Application(s) Topical <User Schedule>  fat emulsion (Fish Oil and Plant Based) 20% Infusion 1.246 Gm/kG/Day (31.3 mL/Hr) IV Continuous <Continuous>  heparin   Injectable 5000 Unit(s) SubCutaneous every 8 hours  metoprolol tartrate Injectable 5 milliGRAM(s) IV Push every 6 hours  pantoprazole Infusion 8 mG/Hr (10 mL/Hr) IV Continuous <Continuous>  Parenteral Nutrition - Adult 1 Each (80 mL/Hr) TPN Continuous <Continuous>    MEDICATIONS  (PRN):  benzocaine 20% Spray 1 Spray(s) Topical four times a day PRN throat pain      FAMILY HISTORY:      Allergies    No Known Allergies    Intolerances        SOCIAL HISTORY:    [  ] Etoh  [  ] Smoking  [  ] Substance abuse     Home Environment:  [   ] Home Alone  [  x ] Lives with Family  [   ] Home Health Aid    Dwelling:  [   ] Apartment  [ x  ] Private House  [   ] Adult Home  [   ] Skilled Nursing Facility      [   ] Short Term  [   ] Long Term  [   ] Stairs       Elevator [   ]    FUNCTIONAL STATUS PTA: (Check all that apply)  Ambulation: [  x  ]Independent    [   ] Dependent     [   ] Non-Ambulatory  Assistive Device: [   ] SA Cane  [   ]  Q Cane  [   ] Walker  [   ]  Wheelchair  ADL : [ x  ] Independent  [    ]  Dependent       Vital Signs Last 24 Hrs  T(C): 36.6 (19 Apr 2021 06:03), Max: 37 (18 Apr 2021 20:46)  T(F): 97.9 (19 Apr 2021 06:03), Max: 98.6 (18 Apr 2021 20:46)  HR: 71 (19 Apr 2021 06:03) (65 - 82)  BP: 187/79 (19 Apr 2021 06:03) (159/72 - 187/79)  BP(mean): --  RR: 18 (19 Apr 2021 06:03) (18 - 18)  SpO2: 96% (19 Apr 2021 06:03) (96% - 98%)      Physical Examination: ( referenced )   General Appearance: NAD   HEENT: EOMI, sclera anicteric.  Heart: RRR   Lungs: Symmetric chest wall expansion, equal rise and fall.  Abdomen:  Soft, nontender, nondistended.   MSK/Extremities: Warm & well-perfused.   Skin: Warm, dry. No jaundice.     LABS:                        9.0    6.22  )-----------( 286      ( 19 Apr 2021 07:36 )             28.6     04-18    139  |  106  |  15  ----------------------------<  98  5.6<H>   |  23  |  1.1    Ca    8.5      18 Apr 2021 22:36  Phos  2.8     04-18  Mg     2.1     04-18      PT/INR - ( 18 Apr 2021 22:36 )   PT: 13.80 sec;   INR: 1.20 ratio         PTT - ( 18 Apr 2021 22:36 )  PTT:30.0 sec      RADIOLOGY & ADDITIONAL STUDIES:

## 2021-04-19 NOTE — PHYSICAL THERAPY INITIAL EVALUATION ADULT - GENERAL OBSERVATIONS, REHAB EVAL
Pt. encountered alert and NAD, semireclined in bed. (+)Nasal suction (+)TPN (+)on RA, agreeable to PT Eval. Pt. left in b/s chair (+)alarms (+)call bell in reach (+)TPN and Nasal suction, NAD

## 2021-04-19 NOTE — PROGRESS NOTE ADULT - PROVIDER SPECIALTY LIST ADULT
Spoke with [facility] re: patient's CVP 6 - 7 and PO intake of about 1L so far today. IVF d/c'd for now. Will encourage PO intake.    Cardiology

## 2021-04-19 NOTE — CHART NOTE - NSCHARTNOTEFT_GEN_A_CORE
Pt NPO with NGT to decompression  EGD planned for today  PPN infusing    T(F): 97.5 (04-19-21 @ 14:53), Max: 98.6 (04-18-21 @ 20:46)  HR: 70 (04-19-21 @ 14:53) (65 - 78)  BP: 133/67 (04-19-21 @ 14:53) (89/51 - 188/79)  RR: 18 (04-19-21 @ 14:53) (11 - 18)  SpO2: 100% (04-19-21 @ 14:53) (95% - 100%)    I&O's Detail    18 Apr 2021 07:01  -  19 Apr 2021 07:00  --------------------------------------------------------  IN:  Total IN: 0 mL    OUT:    Voided (mL): 1000 mL  Total OUT: 1000 mL    Total NET: -1000 mL    04-19    140  |  104  |  15  ----------------------------<  88  4.2   |  22  |  1.1    Ca    8.6      19 Apr 2021 07:36  Phos  2.9     04-19  Mg     2.2     04-19  Triglycerides, Serum (04.16.21 @ 14:00)    Triglycerides, Serum: 56 mg/dL                    9.0    6.22  )-----------( 286      ( 19 Apr 2021 07:36 )             28.6     Diet, NPO (04-15-21 @ 01:13)    ASSESSMENT  79 yo male Hx of CAD, CKD admitted for acute/subacute anemia with outside evidence of FELICITY. CT revealed suspicion for a possible distal antral ulceration with associated thickening around with concerns for contained perforation vs additional ulcers    PLAN  - cont PPN tonight  - daily bmp, in phos, mg while on parenteral nutrition  - f/u egd results, depending on results if anticipate prolonged NPO then will require central access for TPN  - will follow Pt NPO with NGT to suction for decompression  EGD planned for today  PPN infusing    T(F): 97.5 (04-19-21 @ 14:53), Max: 98.6 (04-18-21 @ 20:46)  HR: 70 (04-19-21 @ 14:53) (65 - 78)  BP: 133/67 (04-19-21 @ 14:53) (89/51 - 188/79)  RR: 18 (04-19-21 @ 14:53) (11 - 18)  SpO2: 100% (04-19-21 @ 14:53) (95% - 100%)    I&O's Detail    18 Apr 2021 07:01  -  19 Apr 2021 07:00  --------------------------------------------------------  IN:  Total IN: 0 mL - - - - ERROR - PPN AND LIPIDS INFUSING    OUT:    Voided (mL): 1000 mL  Total OUT: 1000 mL    Total NET: -1000 mL    04-19    140  |  104  |  15  ----------------------------<  88  4.2   |  22  |  1.1    Ca    8.6      19 Apr 2021 07:36  Phos  2.9     04-19  Mg     2.2     04-19  Triglycerides, Serum (04.16.21 @ 14:00)    Triglycerides, Serum: 56 mg/dL                    9.0    6.22  )-----------( 286      ( 19 Apr 2021 07:36 )             28.6     Diet, NPO (04-15-21 @ 01:13)    ASSESSMENT  79 yo male Hx of CAD, CKD admitted for acute/subacute anemia with outside evidence of FELICITY. CT revealed suspicion for a possible distal antral ulceration with associated thickening around with concerns for contained perforation vs additional ulcers    PLAN  - please try to document I&O  - cont PPN tonight - orders entered  - daily bmp, in phos, mg while on parenteral nutrition  - f/u egd results, depending on results if anticipate prolonged NPO then will require central access for TPN  - will follow

## 2021-04-19 NOTE — CHART NOTE - NSCHARTNOTEFT_GEN_A_CORE
PACU ANESTHESIA ADMISSION NOTE      Procedure: EGD  Post op diagnosis:  perforated duodenal ulcer    ____  Intubated  TV:______       Rate: ______      FiO2: ______    __x__  Patent Airway    __x__  Full return of protective reflexes    _x___  Full recovery from anesthesia / back to baseline     Vitals:   T:  98.3F         R:      12            BP:     106/54             Sat:   98                P: 67      Mental Status:  __x__ Awake   ___x__ Alert   _____ Drowsy   _____ Sedated    Nausea/Vomiting:  __x__ NO  ______Yes,   See Post - Op Orders          Pain Scale (0-10):  __0/10___    Treatment: ____ None    __x__ See Post - Op/PCA Orders    Post - Operative Fluids:   ____ Oral   ___x_ See Post - Op Orders    Plan: Discharge:   ____Home       ___x__Floor     _____Critical Care    _____  Other:_________________    Comments: Pt tolerated procedure well, no anesthesia related complications. Pt recovered in endo procedure room, VSS. Report given to endo RN, discharge to next level of care when criteria are met.

## 2021-04-19 NOTE — PROGRESS NOTE ADULT - SUBJECTIVE AND OBJECTIVE BOX
Progress Note: General Surgery  Patient: KELLY DELGADO , 80y (1940)Male   MRN: [id number]  Location: [address]  Visit: 04-14-21 Inpatient  Date: 04-19-21 @ 04:07    Admit Diagnosis/Chief Complaint:     Procedure/Diagnosis:  S/P     Events/ 24h: Patient seen and examined at bedside. No acute events overnight. Pain controlled. Afebrile, VSS.    Vitals: T(F): 98.6 (04-18-21 @ 20:46), Max: 98.6 (04-18-21 @ 05:39)  HR: 65 (04-18-21 @ 23:45)  BP: 177/77 (04-18-21 @ 23:45) (144/65 - 177/77)  RR: 18 (04-18-21 @ 20:46)  SpO2: 98% (04-18-21 @ 20:46)    In:   04-17-21 @ 07:01  -  04-18-21 @ 07:00  --------------------------------------------------------  IN: 0 mL    04-18-21 @ 07:01  -  04-19-21 @ 04:07  --------------------------------------------------------  IN: 0 mL      Out:   04-17-21 @ 07:01  -  04-18-21 @ 07:00  --------------------------------------------------------  OUT:    Voided (mL): 750 mL  Total OUT: 750 mL      04-18-21 @ 07:01  -  04-19-21 @ 04:07  --------------------------------------------------------  OUT:    Voided (mL): 700 mL  Total OUT: 700 mL        Net:   04-17-21 @ 07:01  -  04-18-21 @ 07:00  --------------------------------------------------------  NET: -750 mL    04-18-21 @ 07:01  -  04-19-21 @ 04:07  --------------------------------------------------------  NET: -700 mL        Diet: Diet, NPO (04-15-21 @ 01:13)    IV Fluids: fat emulsion (Fish Oil and Plant Based) 20% Infusion 1.246 Gm/kG/Day (31.3 mL/Hr) IV Continuous <Continuous>  Parenteral Nutrition - Adult 1 Each (80 mL/Hr) TPN Continuous <Continuous>      Physical Examination:  General Appearance: NAD   HEENT: EOMI, sclera anicteric.  Heart: RRR   Lungs: Symmetric chest wall expansion, equal rise and fall.  Abdomen:  Soft, nontender, nondistended.   MSK/Extremities: Warm & well-perfused.   Skin: Warm, dry. No jaundice.       Medications: [Standing]  ampicillin/sulbactam  IVPB      ampicillin/sulbactam  IVPB 1.5 Gram(s) IV Intermittent every 6 hours  chlorhexidine 4% Liquid 1 Application(s) Topical <User Schedule>  fat emulsion (Fish Oil and Plant Based) 20% Infusion 1.246 Gm/kG/Day (31.3 mL/Hr) IV Continuous <Continuous>  heparin   Injectable 5000 Unit(s) SubCutaneous every 8 hours  metoprolol tartrate Injectable 5 milliGRAM(s) IV Push every 6 hours  pantoprazole Infusion 8 mG/Hr (10 mL/Hr) IV Continuous <Continuous>  Parenteral Nutrition - Adult 1 Each (80 mL/Hr) TPN Continuous <Continuous>    DVT Prophylaxis: heparin   Injectable 5000 Unit(s) SubCutaneous every 8 hours    GI Prophylaxis: pantoprazole Infusion 8 mG/Hr IV Continuous <Continuous>    Antibiotics: ampicillin/sulbactam  IVPB      ampicillin/sulbactam  IVPB 1.5 Gram(s) IV Intermittent every 6 hours    Anticoagulation:   Medications:[PRN]  benzocaine 20% Spray 1 Spray(s) Topical four times a day PRN      Labs:                        8.5    5.98  )-----------( 253      ( 18 Apr 2021 22:36 )             26.6     04-18    139  |  106  |  15  ----------------------------<  98  5.6<H>   |  23  |  1.1    Ca    8.5      18 Apr 2021 22:36  Phos  2.8     04-18  Mg     2.1     04-18        PT/INR - ( 18 Apr 2021 22:36 )   PT: 13.80 sec;   INR: 1.20 ratio         PTT - ( 18 Apr 2021 22:36 )  PTT:30.0 sec          Urine/Micro:        Imaging:

## 2021-04-19 NOTE — CONSULT NOTE ADULT - ASSESSMENT
IMPRESSION: Rehab of debilitation / covid +/ GI bleed     PRECAUTIONS: [   ] Cardiac  [   ] Respiratory  [   ] Seizures [   ] Contact Isolation  [   ] Droplet Isolation  [   ] Other    Weight Bearing Status:     RECOMMENDATION:    Out of Bed to Chair     DVT/Decubiti Prophylaxis    REHAB PLAN:     [  x  ] Bedside P/T 3-5 times a week   [    ]   Bedside O/T  2-3 times a week             [    ] Speech Therapy               [    ]  No Rehab Therapy Indicated   Conditioning/ROM                                    ADL  Bed Mobility                                               Conditioning/ROM  Transfers                                                     Bed Mobility  Sitting /Standing Balance                         Transfers                                        Gait Training                                               Sitting/Standing Balance  Stair Training [   ]Applicable                    Home equipment Eval                                                                        Splinting  [   ] Only      GOALS:   ADL   [    ]   Independent                    Transfers  [   x ] Independent                          Ambulation  [   x ] Independent     [  x   ] With device                            [    ]  CG                                                         [    ]  CG                                                                  [    ] CG                            [    ] Min A                                                   [    ] Min A                                                              [    ] Min  A          DISCHARGE PLAN:   [    ]  Good candidate for Intensive Rehabilitation/Hospital based                                             Will tolerate 3hrs Intensive Rehab Daily                                       [ x    ]  Short Term Rehab in Skilled Nursing Facility                         vs              [  x   ]  Home with Outpatient or VN services                                         [     ]  Possible Candidate for Intensive Hospital based Rehab

## 2021-04-20 LAB
ANION GAP SERPL CALC-SCNC: 13 MMOL/L — SIGNIFICANT CHANGE UP (ref 7–14)
BUN SERPL-MCNC: 16 MG/DL — SIGNIFICANT CHANGE UP (ref 10–20)
CALCIUM SERPL-MCNC: 8.8 MG/DL — SIGNIFICANT CHANGE UP (ref 8.5–10.1)
CHLORIDE SERPL-SCNC: 104 MMOL/L — SIGNIFICANT CHANGE UP (ref 98–110)
CO2 SERPL-SCNC: 23 MMOL/L — SIGNIFICANT CHANGE UP (ref 17–32)
CREAT SERPL-MCNC: 1.1 MG/DL — SIGNIFICANT CHANGE UP (ref 0.7–1.5)
D DIMER BLD IA.RAPID-MCNC: 398 NG/ML DDU — HIGH (ref 0–230)
GLUCOSE SERPL-MCNC: 90 MG/DL — SIGNIFICANT CHANGE UP (ref 70–99)
MAGNESIUM SERPL-MCNC: 2.2 MG/DL — SIGNIFICANT CHANGE UP (ref 1.8–2.4)
PHOSPHATE SERPL-MCNC: 3.3 MG/DL — SIGNIFICANT CHANGE UP (ref 2.1–4.9)
POTASSIUM SERPL-MCNC: 5.3 MMOL/L — HIGH (ref 3.5–5)
POTASSIUM SERPL-SCNC: 5.3 MMOL/L — HIGH (ref 3.5–5)
SODIUM SERPL-SCNC: 140 MMOL/L — SIGNIFICANT CHANGE UP (ref 135–146)

## 2021-04-20 PROCEDURE — 99232 SBSQ HOSP IP/OBS MODERATE 35: CPT

## 2021-04-20 PROCEDURE — 74176 CT ABD & PELVIS W/O CONTRAST: CPT | Mod: 26

## 2021-04-20 RX ORDER — IOHEXOL 300 MG/ML
30 INJECTION, SOLUTION INTRAVENOUS ONCE
Refills: 0 | Status: DISCONTINUED | OUTPATIENT
Start: 2021-04-20 | End: 2021-04-26

## 2021-04-20 RX ORDER — I.V. FAT EMULSION 20 G/100ML
1.25 EMULSION INTRAVENOUS
Qty: 100.05 | Refills: 0 | Status: DISCONTINUED | OUTPATIENT
Start: 2021-04-20 | End: 2021-04-20

## 2021-04-20 RX ORDER — ELECTROLYTE SOLUTION,INJ
1 VIAL (ML) INTRAVENOUS
Refills: 0 | Status: DISCONTINUED | OUTPATIENT
Start: 2021-04-20 | End: 2021-04-20

## 2021-04-20 RX ADMIN — AMPICILLIN SODIUM AND SULBACTAM SODIUM 100 GRAM(S): 250; 125 INJECTION, POWDER, FOR SUSPENSION INTRAMUSCULAR; INTRAVENOUS at 13:15

## 2021-04-20 RX ADMIN — Medication 5 MILLIGRAM(S): at 23:12

## 2021-04-20 RX ADMIN — Medication 1 EACH: at 20:01

## 2021-04-20 RX ADMIN — Medication 5 MILLIGRAM(S): at 05:28

## 2021-04-20 RX ADMIN — AMPICILLIN SODIUM AND SULBACTAM SODIUM 100 GRAM(S): 250; 125 INJECTION, POWDER, FOR SUSPENSION INTRAMUSCULAR; INTRAVENOUS at 19:32

## 2021-04-20 RX ADMIN — HEPARIN SODIUM 5000 UNIT(S): 5000 INJECTION INTRAVENOUS; SUBCUTANEOUS at 05:27

## 2021-04-20 RX ADMIN — AMPICILLIN SODIUM AND SULBACTAM SODIUM 100 GRAM(S): 250; 125 INJECTION, POWDER, FOR SUSPENSION INTRAMUSCULAR; INTRAVENOUS at 23:15

## 2021-04-20 RX ADMIN — HEPARIN SODIUM 5000 UNIT(S): 5000 INJECTION INTRAVENOUS; SUBCUTANEOUS at 13:54

## 2021-04-20 RX ADMIN — I.V. FAT EMULSION 31.3 GM/KG/DAY: 20 EMULSION INTRAVENOUS at 20:01

## 2021-04-20 RX ADMIN — AMPICILLIN SODIUM AND SULBACTAM SODIUM 100 GRAM(S): 250; 125 INJECTION, POWDER, FOR SUSPENSION INTRAMUSCULAR; INTRAVENOUS at 05:28

## 2021-04-20 RX ADMIN — HEPARIN SODIUM 5000 UNIT(S): 5000 INJECTION INTRAVENOUS; SUBCUTANEOUS at 21:31

## 2021-04-20 RX ADMIN — CHLORHEXIDINE GLUCONATE 1 APPLICATION(S): 213 SOLUTION TOPICAL at 06:14

## 2021-04-20 RX ADMIN — Medication 5 MILLIGRAM(S): at 19:32

## 2021-04-20 RX ADMIN — Medication 5 MILLIGRAM(S): at 13:53

## 2021-04-20 RX ADMIN — PANTOPRAZOLE SODIUM 10 MG/HR: 20 TABLET, DELAYED RELEASE ORAL at 06:14

## 2021-04-20 NOTE — PROGRESS NOTE ADULT - ASSESSMENT
Follow up cardiology  Pain management   Heparin sub q   IV antibiotics  Monitor bowel function   Upper gi series am   TPN

## 2021-04-20 NOTE — PROGRESS NOTE ADULT - SUBJECTIVE AND OBJECTIVE BOX
KELLY DELGADO  80y Male   [id number]    Hospital Day: 8  Post Operative Day:  Procedure:  Patient is a 80y old  Male who presents with a chief complaint of contained perforated gastric ulcer (2021 18:54)    PAST MEDICAL & SURGICAL HISTORY:  CAD (coronary artery disease)    GERD (gastroesophageal reflux disease)        Events of the Last 24h:  Vital Signs Last 24 Hrs  T(C): 36.6 (2021 20:21), Max: 36.8 (2021 18:21)  T(F): 97.9 (2021 20:21), Max: 98.2 (2021 18:21)  HR: 71 (:21) (66 - 78)  BP: 154/75 (2021 20:21) (89/51 - 188/79)  BP(mean): --  RR: 18 (:21) (11 - 18)  SpO2: 100% (:) (95% - 100%)        Diet, NPO (04-15-21 @ 01:13)      I&O's Summary    2021 07:01  -  2021 07:00  --------------------------------------------------------  IN: 0 mL / OUT: 1000 mL / NET: -1000 mL     I&O's Detail    2021 07:01  -  2021 07:00  --------------------------------------------------------  IN:  Total IN: 0 mL    OUT:    Voided (mL): 1000 mL  Total OUT: 1000 mL    Total NET: -1000 mL          MEDICATIONS  (STANDING):  ampicillin/sulbactam  IVPB      ampicillin/sulbactam  IVPB 1.5 Gram(s) IV Intermittent every 6 hours  chlorhexidine 4% Liquid 1 Application(s) Topical <User Schedule>  fat emulsion (Fish Oil and Plant Based) 20% Infusion 1.246 Gm/kG/Day (31.3 mL/Hr) IV Continuous <Continuous>  heparin   Injectable 5000 Unit(s) SubCutaneous every 8 hours  metoprolol tartrate Injectable 5 milliGRAM(s) IV Push every 6 hours  pantoprazole Infusion 8 mG/Hr (10 mL/Hr) IV Continuous <Continuous>  Parenteral Nutrition - Adult 1 Each (80 mL/Hr) TPN Continuous <Continuous>  Parenteral Nutrition - Adult 1 Each (80 mL/Hr) TPN Continuous <Continuous>    MEDICATIONS  (PRN):  benzocaine 20% Spray 1 Spray(s) Topical four times a day PRN throat pain      PHYSICAL EXAM:    GENERAL: NAD    HEENT: NCAT    CHEST/LUNGS: CTAB    HEART: RRR,  No murmurs, rubs, or gallops    ABDOMEN: SNTND +BS    EXTREMITIES:  FROM, No clubbing, cyanosis, or edema, palpable pulse    NEURO: No focal neurological deficits    SKIN: No rashes or lesions    INCISION/WOUNDS:                          9.0    5.35  )-----------( 265      ( 2021 21:36 )             28.7        CBC Full  -  ( 2021 21:36 )  WBC Count : 5.35 K/uL  RBC Count : 3.10 M/uL  Hemoglobin : 9.0 g/dL  Hematocrit : 28.7 %  Platelet Count - Automated : 265 K/uL  Mean Cell Volume : 92.6 fL  Mean Cell Hemoglobin : 29.0 pg  Mean Cell Hemoglobin Concentration : 31.4 g/dL  Auto Neutrophil # : 3.87 K/uL  Auto Lymphocyte # : 0.35 K/uL  Auto Monocyte # : 0.69 K/uL  Auto Eosinophil # : 0.41 K/uL  Auto Basophil # : 0.01 K/uL  Auto Neutrophil % : 72.3 %  Auto Lymphocyte % : 6.5 %  Auto Monocyte % : 12.9 %  Auto Eosinophil % : 7.7 %  Auto Basophil % : 0.2 %               137   |  103   |  17                 Ca: 8.3    BMP:   ----------------------------< 89     M.2   (21 @ 21:36)             4.5    |  24    | 1.1                Ph: 3.2      LFT:     TPro: 5.7 / Alb: 3.6 / TBili: 0.4 / DBili: 0.2 / AST: 14 / ALT: 11 / AlkPhos: 90   (21 @ 15:56)      PT/INR - ( 2021 22:36 )   PT: 13.80 sec;   INR: 1.20 ratio         PTT - ( 2021 22:36 )  PTT:30.0 sec

## 2021-04-20 NOTE — CHART NOTE - NSCHARTNOTEFT_GEN_A_CORE
spoke with son Narayan Gonzáles JR.  updated him on plan of care and purpose of CT scan today  all questions were answered

## 2021-04-20 NOTE — CHART NOTE - NSCHARTNOTEFT_GEN_A_CORE
s/p egd yesterday, unable to locate results but discussed with surgical team  Pt will require prolonged NPO and TPN while perforated duodenum heals  Currently on PPN. Lytes noted, K+ 5.3    T(F): 97.5 (04-20-21 @ 13:54), Max: 98.4 (04-20-21 @ 05:00)  HR: 66 (04-20-21 @ 13:54) (65 - 72)  BP: 177/75 (04-20-21 @ 13:54) (137/71 - 177/75)  RR: 18 (04-20-21 @ 13:54) (18 - 18)  SpO2: 97% (04-20-21 @ 13:54) (97% - 100%)    I&O's Detail    20 Apr 2021 07:01  -  20 Apr 2021 16:32  --------------------------------------------------------  IN:  Total IN: 0 mL    OUT:    Voided (mL): 200 mL  Total OUT: 200 mL    Total NET: -200 mL    04-20    140  |  104  |  16  ----------------------------<  90  5.3<H>   |  23  |  1.1    Ca    8.8      20 Apr 2021 07:55  Phos  3.3     04-20  Mg     2.2     04-20                        9.0    5.35  )-----------( 265      ( 19 Apr 2021 21:36 )             28.7     Diet, NPO (04-15-21 @ 01:13)    ASSESSMENT  81 yo male Hx of CAD, CKD admitted for acute/subacute anemia with outside evidence of FELICITY. CT revealed suspicion for a possible distal antral ulceration with associated thickening around with concerns for contained perforation vs additional ulcers    PLAN  - please try to document I&O  - cont PPN tonight - orders entered and lytes adjusted  - daily bmp, in phos, mg while on parenteral nutrition  - PICC placement planned, once PICC in place start TPN  -  for d/c planning for home TPN  - will follow

## 2021-04-21 LAB
ANION GAP SERPL CALC-SCNC: 13 MMOL/L — SIGNIFICANT CHANGE UP (ref 7–14)
ANION GAP SERPL CALC-SCNC: 14 MMOL/L — SIGNIFICANT CHANGE UP (ref 7–14)
BASOPHILS # BLD AUTO: 0.02 K/UL — SIGNIFICANT CHANGE UP (ref 0–0.2)
BASOPHILS NFR BLD AUTO: 0.3 % — SIGNIFICANT CHANGE UP (ref 0–1)
BUN SERPL-MCNC: 17 MG/DL — SIGNIFICANT CHANGE UP (ref 10–20)
BUN SERPL-MCNC: 28 MG/DL — HIGH (ref 10–20)
CALCIUM SERPL-MCNC: 8.5 MG/DL — SIGNIFICANT CHANGE UP (ref 8.5–10.1)
CALCIUM SERPL-MCNC: 9 MG/DL — SIGNIFICANT CHANGE UP (ref 8.5–10.1)
CHLORIDE SERPL-SCNC: 101 MMOL/L — SIGNIFICANT CHANGE UP (ref 98–110)
CHLORIDE SERPL-SCNC: 103 MMOL/L — SIGNIFICANT CHANGE UP (ref 98–110)
CO2 SERPL-SCNC: 20 MMOL/L — SIGNIFICANT CHANGE UP (ref 17–32)
CO2 SERPL-SCNC: 21 MMOL/L — SIGNIFICANT CHANGE UP (ref 17–32)
CREAT SERPL-MCNC: 1 MG/DL — SIGNIFICANT CHANGE UP (ref 0.7–1.5)
CREAT SERPL-MCNC: 1.4 MG/DL — SIGNIFICANT CHANGE UP (ref 0.7–1.5)
CRP SERPL-MCNC: 31 MG/L — HIGH
EOSINOPHIL # BLD AUTO: 0.15 K/UL — SIGNIFICANT CHANGE UP (ref 0–0.7)
EOSINOPHIL NFR BLD AUTO: 2.5 % — SIGNIFICANT CHANGE UP (ref 0–8)
FERRITIN SERPL-MCNC: 59 NG/ML — SIGNIFICANT CHANGE UP (ref 30–400)
GLUCOSE SERPL-MCNC: 78 MG/DL — SIGNIFICANT CHANGE UP (ref 70–99)
GLUCOSE SERPL-MCNC: 94 MG/DL — SIGNIFICANT CHANGE UP (ref 70–99)
HCT VFR BLD CALC: 32 % — LOW (ref 42–52)
HGB BLD-MCNC: 10.1 G/DL — LOW (ref 14–18)
IMM GRANULOCYTES NFR BLD AUTO: 0.3 % — SIGNIFICANT CHANGE UP (ref 0.1–0.3)
LYMPHOCYTES # BLD AUTO: 0.51 K/UL — LOW (ref 1.2–3.4)
LYMPHOCYTES # BLD AUTO: 8.6 % — LOW (ref 20.5–51.1)
MAGNESIUM SERPL-MCNC: 2.1 MG/DL — SIGNIFICANT CHANGE UP (ref 1.8–2.4)
MAGNESIUM SERPL-MCNC: 2.3 MG/DL — SIGNIFICANT CHANGE UP (ref 1.8–2.4)
MCHC RBC-ENTMCNC: 28.7 PG — SIGNIFICANT CHANGE UP (ref 27–31)
MCHC RBC-ENTMCNC: 31.6 G/DL — LOW (ref 32–37)
MCV RBC AUTO: 90.9 FL — SIGNIFICANT CHANGE UP (ref 80–94)
MONOCYTES # BLD AUTO: 0.76 K/UL — HIGH (ref 0.1–0.6)
MONOCYTES NFR BLD AUTO: 12.9 % — HIGH (ref 1.7–9.3)
NEUTROPHILS # BLD AUTO: 4.44 K/UL — SIGNIFICANT CHANGE UP (ref 1.4–6.5)
NEUTROPHILS NFR BLD AUTO: 75.4 % — HIGH (ref 42.2–75.2)
NRBC # BLD: 0 /100 WBCS — SIGNIFICANT CHANGE UP (ref 0–0)
PHOSPHATE SERPL-MCNC: 3.8 MG/DL — SIGNIFICANT CHANGE UP (ref 2.1–4.9)
PHOSPHATE SERPL-MCNC: 4.6 MG/DL — SIGNIFICANT CHANGE UP (ref 2.1–4.9)
PLATELET # BLD AUTO: 315 K/UL — SIGNIFICANT CHANGE UP (ref 130–400)
POTASSIUM SERPL-MCNC: 4.8 MMOL/L — SIGNIFICANT CHANGE UP (ref 3.5–5)
POTASSIUM SERPL-MCNC: 5.4 MMOL/L — HIGH (ref 3.5–5)
POTASSIUM SERPL-SCNC: 4.8 MMOL/L — SIGNIFICANT CHANGE UP (ref 3.5–5)
POTASSIUM SERPL-SCNC: 5.4 MMOL/L — HIGH (ref 3.5–5)
PROCALCITONIN SERPL-MCNC: 0.1 NG/ML — SIGNIFICANT CHANGE UP (ref 0.02–0.1)
RBC # BLD: 3.52 M/UL — LOW (ref 4.7–6.1)
RBC # FLD: 16.7 % — HIGH (ref 11.5–14.5)
SODIUM SERPL-SCNC: 135 MMOL/L — SIGNIFICANT CHANGE UP (ref 135–146)
SODIUM SERPL-SCNC: 137 MMOL/L — SIGNIFICANT CHANGE UP (ref 135–146)
WBC # BLD: 5.9 K/UL — SIGNIFICANT CHANGE UP (ref 4.8–10.8)
WBC # FLD AUTO: 5.9 K/UL — SIGNIFICANT CHANGE UP (ref 4.8–10.8)

## 2021-04-21 PROCEDURE — 99232 SBSQ HOSP IP/OBS MODERATE 35: CPT

## 2021-04-21 RX ORDER — PANTOPRAZOLE SODIUM 20 MG/1
40 TABLET, DELAYED RELEASE ORAL
Refills: 0 | Status: DISCONTINUED | OUTPATIENT
Start: 2021-04-21 | End: 2021-04-26

## 2021-04-21 RX ORDER — PANTOPRAZOLE SODIUM 20 MG/1
40 TABLET, DELAYED RELEASE ORAL
Refills: 0 | Status: DISCONTINUED | OUTPATIENT
Start: 2021-04-21 | End: 2021-04-21

## 2021-04-21 RX ORDER — ELECTROLYTE SOLUTION,INJ
1 VIAL (ML) INTRAVENOUS
Refills: 0 | Status: DISCONTINUED | OUTPATIENT
Start: 2021-04-21 | End: 2021-04-22

## 2021-04-21 RX ORDER — SUCRALFATE 1 G
1 TABLET ORAL
Refills: 0 | Status: DISCONTINUED | OUTPATIENT
Start: 2021-04-21 | End: 2021-04-26

## 2021-04-21 RX ADMIN — CHLORHEXIDINE GLUCONATE 1 APPLICATION(S): 213 SOLUTION TOPICAL at 07:46

## 2021-04-21 RX ADMIN — Medication 1 GRAM(S): at 18:18

## 2021-04-21 RX ADMIN — Medication 5 MILLIGRAM(S): at 12:11

## 2021-04-21 RX ADMIN — PANTOPRAZOLE SODIUM 10 MG/HR: 20 TABLET, DELAYED RELEASE ORAL at 05:32

## 2021-04-21 RX ADMIN — HEPARIN SODIUM 5000 UNIT(S): 5000 INJECTION INTRAVENOUS; SUBCUTANEOUS at 05:24

## 2021-04-21 RX ADMIN — PANTOPRAZOLE SODIUM 40 MILLIGRAM(S): 20 TABLET, DELAYED RELEASE ORAL at 18:18

## 2021-04-21 RX ADMIN — HEPARIN SODIUM 5000 UNIT(S): 5000 INJECTION INTRAVENOUS; SUBCUTANEOUS at 13:42

## 2021-04-21 RX ADMIN — AMPICILLIN SODIUM AND SULBACTAM SODIUM 100 GRAM(S): 250; 125 INJECTION, POWDER, FOR SUSPENSION INTRAMUSCULAR; INTRAVENOUS at 05:24

## 2021-04-21 RX ADMIN — Medication 5 MILLIGRAM(S): at 18:18

## 2021-04-21 RX ADMIN — HEPARIN SODIUM 5000 UNIT(S): 5000 INJECTION INTRAVENOUS; SUBCUTANEOUS at 22:32

## 2021-04-21 RX ADMIN — AMPICILLIN SODIUM AND SULBACTAM SODIUM 100 GRAM(S): 250; 125 INJECTION, POWDER, FOR SUSPENSION INTRAMUSCULAR; INTRAVENOUS at 12:10

## 2021-04-21 RX ADMIN — Medication 5 MILLIGRAM(S): at 05:24

## 2021-04-21 NOTE — PROGRESS NOTE ADULT - ASSESSMENT
Dispo planning  Continue tpn  Home with TPN  Follow up GI  NPO   Pain management   IV ABX   Heparin subq

## 2021-04-21 NOTE — CHART NOTE - NSCHARTNOTEFT_GEN_A_CORE
Pt OOB, comfortable  Tm 99.4  PICC planned for today and will change PPN to TPN once in place  NPO. NGT out. No vomiting  Planning for home TPN while duodenal perf heals    T(F): 97.6 (04-21-21 @ 11:54), Max: 99.4 (04-20-21 @ 19:56)  HR: 63 (04-21-21 @ 11:54) (61 - 63)  BP: 131/66 (04-21-21 @ 11:54) (131/66 - 167/74)  RR: 18 (04-21-21 @ 11:54) (18 - 18)  SpO2: 96% (04-21-21 @ 11:54) (96% - 98%)    I&O's Detail    20 Apr 2021 07:01  -  21 Apr 2021 07:00  --------------------------------------------------------  IN:  Total IN: 0 mL    OUT:    Voided (mL): 800 mL  Total OUT: 800 mL    Total NET: -800 mL    04-21    137  |  103  |  17  ----------------------------<  78  4.8   |  20  |  1.0    Ca    8.5      21 Apr 2021 07:24  Phos  3.8     04-21  Mg     2.1     04-21                        9.0    5.35  )-----------( 265      ( 19 Apr 2021 21:36 )             28.7     Diet, NPO (04-15-21 @ 01:13)    ASSESSMENT  81 yo male Hx of CAD, CKD admitted for acute/subacute anemia with outside evidence of FELICITY. CT revealed suspicion for a possible distal antral ulceration with associated thickening around with concerns for contained perforation vs additional ulcers    PLAN  - please try to document I&O  - once PICC placed will start TPN orders entered and lytes adjusted  - daily bmp, in phos, mg while on parenteral nutrition  -  for d/c planning for home TPN, [place] to provide home care services for home TPN  - will follow Pt OOB, comfortable  Tm 99.4  PICC planned for today and will change PPN to TPN once in place  NPO. NGT out. No vomiting  Planning for home TPN while duodenal perf heals    T(F): 97.6 (04-21-21 @ 11:54), Max: 99.4 (04-20-21 @ 19:56)  HR: 63 (04-21-21 @ 11:54) (61 - 63)  BP: 131/66 (04-21-21 @ 11:54) (131/66 - 167/74)  RR: 18 (04-21-21 @ 11:54) (18 - 18)  SpO2: 96% (04-21-21 @ 11:54) (96% - 98%)    I&O's Detail - nothing documented in EMR....but PN is running    04-21    137  |  103  |  17  ----------------------------<  78  4.8   |  20  |  1.0    Ca    8.5      21 Apr 2021 07:24  Phos  3.8     04-21  Mg     2.1     04-21                        9.0    5.35  )-----------( 265      ( 19 Apr 2021 21:36 )             28.7     Diet, NPO (04-15-21 @ 01:13)    ASSESSMENT  79 yo male Hx of CAD, CKD admitted for acute/subacute anemia with outside evidence of FELICITY. CT revealed suspicion for a possible distal antral ulceration with associated thickening around with concerns for contained perforation vs additional ulcers    PLAN  - please try to document I&O  - d/w resident - PICC is scheduled to be placed today  - once PICC placed will start TPN - orders entered and lytes adjusted  - daily bmp, in phos, mg while on parenteral nutrition  -  for d/c planning for home TPN, [place] to provide home care services for home TPN  - will follow  - on discharge, should be seen in ~ 2 weeks in outpt office - [phone]

## 2021-04-21 NOTE — PROGRESS NOTE ADULT - ASSESSMENT
ASSESSMENT  81yo M, PMHx GERD, HTN, CAD, HLD, CKD3, CABGx4, presents for abdominal pain and BRBPR found to have a contained perforation    IMPRESSION  #Contained gastric perforation secondary to PUD    s/p EGD  < from: CT Abdomen and Pelvis w/ Oral Cont (04.20.21 @ 11:41) >  1.Demonstration of focal outpouching in the gastric antrum which fills with oral contrast, suggestive of contained perforation. The additional focus of perigastric extraluminal gas seen on prior CT not currently seen. Decreasing gastric and duodenal wall thickening and surrounding inflammatory change compared to prior CT.  2.  Redemonstration of indeterminate 6.4 cm left renal lesion. Recommend evaluation with outpatient contrast-enhanced MRI.  < from: CT Abdomen and Pelvis w/ Oral Cont (04.13.21 @ 22:09) >  Thickening of the gastric wall in the region of the greater curvature as well as the region of the antrum, and thickening of the duodenal bulb and second portion of the duodenum are again noted. These findings are associated with stranding in the adjacent fat. Findings are consistent with gastritis and duodenitis. Despite the degree of thickening of the gastric and duodenal wall, the oral contrast material has passed into the proximal small bowel.  Previously noted small gas bubbles are again seen posterior to the posterior wall of the gastric antrum (3/25-26). Findings are consistent with a contained perforated ulcer into the region of the lesser sac with inflammatory changes but without a discrete fluid collection. There is no evidence of active extravasation of the oral contrast material. There is no evidence of pneumoperitoneum or free air elsewhere within the abdomen.  #Asymptomatic bacteriuria UCX e. faecalis UA WBC 55  #CT atelectasis  #Sepsis ruled out on admission   Creatinine, Serum: 1.2 mg/dL (04.16.21 @ 09:09)      RECOMMENDATIONS  - As contained perforation, see no need for further antimicrobial coverage  - Send H pylori stool Ag    If any questions, please call or send a message on [employer] Teams  Spectra [phone]

## 2021-04-21 NOTE — PROGRESS NOTE ADULT - ATTENDING COMMENTS
Patient seen and examined  4/15/21
admitted for contained duodenal perf. awaiting PICC for TPN.
Assessment:  HTN, elevated, pt denies symptoms  CAD s/p CABG 10 years ago, no prior PCI  Gastric ulcer with concern for contained perforation  Anemia s/p 2 units prbc, Hb stable  Currently completely NPO on PPN  Euvolemic on exam      Plan:  aspirin on hold 2/2 GIB, resume when clear by GI  c/w statin and beta-blocker  c/w lopressor IV pushes for now  if remains strictly NPO, can add IV hydralazine 10 mg q6h prn (as needed) for better BP control  Otherwise, pt's BP has improved since yesterday

## 2021-04-21 NOTE — CHART NOTE - NSCHARTNOTEFT_GEN_A_CORE
spoke to son Narayan on the phone at 5pm. Gave him the updates and answered all the questions. explained to him that patient is going to have the PICC line today.

## 2021-04-21 NOTE — PROGRESS NOTE ADULT - ASSESSMENT
81 yo M Hx of CAD admitted for a contained perforation in the duodenal bulb.  Awaiting path: possible etiology malignant vs PUD-HP PUD-NSAID(Chronic ASA)    Rec:  IV PPI  Awaiting PICC for TPN  Await path  Will follow

## 2021-04-21 NOTE — PROGRESS NOTE ADULT - SUBJECTIVE AND OBJECTIVE BOX
KELLY DELGADO  80y, Male  Allergy: No Known Allergies      LOS  7d    CHIEF COMPLAINT: contained perforated gastric ulcer (21 Apr 2021 01:00)      INTERVAL EVENTS/HPI  - No acute events overnight  - T(F): , Max: 99.4 (04-20-21 @ 19:56)  - Denies any worsening symptoms  - Tolerating medication  - WBC Count: 5.35 (04-19-21 @ 21:36)  WBC Count: 6.22 (04-19-21 @ 07:36)     - Creatinine, Serum: 1.0 (04-21-21 @ 07:24)  Creatinine, Serum: 1.1 (04-20-21 @ 07:55)       ROS  General: Denies rigors, nightsweats  HEENT: Denies headache, rhinorrhea, sore throat, eye pain  CV: Denies CP, palpitations  PULM: Denies wheezing, hemoptysis  GI: Denies hematemesis, hematochezia, melena  : Denies discharge, hematuria  MSK: Denies arthralgias, myalgias  SKIN: Denies rash, lesions  NEURO: Denies paresthesias, weakness  PSYCH: Denies depression, anxiety    VITALS:  T(F): 96.1, Max: 99.4 (04-20-21 @ 19:56)  HR: 63  BP: 151/69  RR: 18Vital Signs Last 24 Hrs  T(C): 35.6 (21 Apr 2021 05:00), Max: 37.4 (20 Apr 2021 19:56)  T(F): 96.1 (21 Apr 2021 05:00), Max: 99.4 (20 Apr 2021 19:56)  HR: 63 (21 Apr 2021 05:00) (61 - 66)  BP: 151/69 (21 Apr 2021 05:00) (151/69 - 177/75)  BP(mean): --  RR: 18 (21 Apr 2021 05:00) (18 - 18)  SpO2: 97% (21 Apr 2021 05:00) (97% - 98%)    PHYSICAL EXAM:  Gen: NAD, resting in bed  HEENT: Normocephalic, atraumatic  Neck: supple, no lymphadenopathy  CV: Regular rate & regular rhythm  Lungs: decreased BS at bases, no fremitus  Abdomen: Soft, BS present  Ext: Warm, well perfused  Neuro: non focal, awake  Skin: no rash, no erythema  Lines: no phlebitis    FH: Non-contributory  Social Hx: Non-contributory    TESTS & MEASUREMENTS:                        9.0    5.35  )-----------( 265      ( 19 Apr 2021 21:36 )             28.7     04-21    137  |  103  |  17  ----------------------------<  78  4.8   |  20  |  1.0    Ca    8.5      21 Apr 2021 07:24  Phos  3.8     04-21  Mg     2.1     04-21      eGFR if Non African American: 72 mL/min/1.73M2 (04-21-21 @ 07:24)  eGFR if : 83 mL/min/1.73M2 (04-21-21 @ 07:24)          Culture - Urine (collected 04-13-21 @ 20:16)  Source: .Urine Clean Catch (Midstream)  Final Report (04-16-21 @ 06:34):    >100,000 CFU/ml Enterococcus faecalis  Organism: Enterococcus faecalis (04-16-21 @ 06:34)  Organism: Enterococcus faecalis (04-16-21 @ 06:34)      -  Ampicillin: S <=2 Predicts results to ampicillin/sulbactam, amoxacillin-clavulanate and  piperacillin-tazobactam.      -  Ciprofloxacin: S <=1      -  Levofloxacin: S <=1      -  Nitrofurantoin: S <=32 Should not be used to treat pyelonephritis.      -  Tetra/Doxy: R >8      -  Vancomycin: S 2      Method Type: HEAVNE    Culture - Blood (collected 04-13-21 @ 17:22)  Source: .Blood Blood-Peripheral  Final Report (04-18-21 @ 23:00):    No Growth Final            INFECTIOUS DISEASES TESTING  COVID-19 PCR: NotDetec (04-17-21 @ 17:18)  COVID-19 PCR: Detected (04-16-21 @ 16:36)  COVID-19 PCR: NotDetec (04-13-21 @ 15:56)      INFLAMMATORY MARKERS      RADIOLOGY & ADDITIONAL TESTS:  I have personally reviewed the last available Chest xray  CXR      CT      CARDIOLOGY TESTING  12 Lead ECG:   Ventricular Rate 66 BPM    Atrial Rate 66 BPM    P-R Interval 216 ms    QRS Duration 100 ms    Q-T Interval 400 ms    QTC Calculation(Bazett) 419 ms    P Axis 55 degrees    R Axis -71 degrees    T Axis 36 degrees    Diagnosis Line Sinus rhythm [id number] degree A-V block  Left anterior fascicular block  Abnormal ECG    Confirmed by Jameel Crump ([id number]) on 4/19/2021 6:13:18 AM (04-19-21 @ 04:12)  12 Lead ECG:   Ventricular Rate 78 BPM    Atrial Rate 78 BPM    P-R Interval 234 ms    QRS Duration 104 ms    Q-T Interval 374 ms    QTC Calculation(Bazett) 426 ms    P Axis 46 degrees    R Axis -61 degrees    T Axis 16 degrees    Diagnosis Line Sinus rhythm [id number] degree A-V block  Incomplete right bundle branch block  Left anterior fascicular block  Nonspecific ST and T wave abnormality  Abnormal ECG    Confirmed by Jameel Crump ([id number]) on 4/13/2021 6:33:05 PM (04-13-21 @ 16:34)      MEDICATIONS  ampicillin/sulbactam  IVPB     ampicillin/sulbactam  IVPB 1.5 IV Intermittent every 6 hours  chlorhexidine 4% Liquid 1 Topical <User Schedule>  fat emulsion (Fish Oil and Plant Based) 20% Infusion 1.246 IV Continuous <Continuous>  heparin   Injectable 5000 SubCutaneous every 8 hours  iohexol 300 mG (iodine)/mL Oral Solution 30 Oral once  metoprolol tartrate Injectable 5 IV Push every 6 hours  pantoprazole    Tablet 40 Oral two times a day  Parenteral Nutrition - Adult 1 TPN Continuous <Continuous>      WEIGHT  Weight (kg): 74.8 (04-19-21 @ 12:56)  Creatinine, Serum: 1.0 mg/dL (04-21-21 @ 07:24)      ANTIBIOTICS:  ampicillin/sulbactam  IVPB      ampicillin/sulbactam  IVPB 1.5 Gram(s) IV Intermittent every 6 hours      All available historical records have been reviewed

## 2021-04-21 NOTE — PROGRESS NOTE ADULT - SUBJECTIVE AND OBJECTIVE BOX
Hospital day: 9  Post Operative Day:  Procedure:  Patient is a 80y old  Male who presents with a chief complaint of contained perforated gastric ulcer (2021 01:54)    PAST MEDICAL & SURGICAL HISTORY:  CAD (coronary artery disease)    GERD (gastroesophageal reflux disease)        Events of the Last 24h:  Vital Signs Last 24 Hrs  T(C): 37.4 (2021 19:56), Max: 37.4 (2021 19:56)  T(F): 99.4 (2021 19:56), Max: 99.4 (2021 19:56)  HR: 61 (2021 19:56) (61 - 67)  BP: 167/74 (2021 19:56) (137/71 - 177/75)  BP(mean): --  RR: 18 (2021 19:56) (18 - 18)  SpO2: 98% (2021 19:56) (97% - 100%)        Diet, NPO (04-15-21 @ 01:13)      I&O's Summary    2021 07:01  -  2021 01:00  --------------------------------------------------------  IN: 0 mL / OUT: 800 mL / NET: -800 mL     I&O's Detail    2021 07:01  -  2021 01:00  --------------------------------------------------------  IN:  Total IN: 0 mL    OUT:    Voided (mL): 800 mL  Total OUT: 800 mL    Total NET: -800 mL          MEDICATIONS  (STANDING):  ampicillin/sulbactam  IVPB      ampicillin/sulbactam  IVPB 1.5 Gram(s) IV Intermittent every 6 hours  chlorhexidine 4% Liquid 1 Application(s) Topical <User Schedule>  fat emulsion (Fish Oil and Plant Based) 20% Infusion 1.246 Gm/kG/Day (31.3 mL/Hr) IV Continuous <Continuous>  heparin   Injectable 5000 Unit(s) SubCutaneous every 8 hours  iohexol 300 mG (iodine)/mL Oral Solution 30 milliLiter(s) Oral once  metoprolol tartrate Injectable 5 milliGRAM(s) IV Push every 6 hours  pantoprazole Infusion 8 mG/Hr (10 mL/Hr) IV Continuous <Continuous>  Parenteral Nutrition - Adult 1 Each (80 mL/Hr) TPN Continuous <Continuous>  Parenteral Nutrition - Adult 1 Each (80 mL/Hr) TPN Continuous <Continuous>    MEDICATIONS  (PRN):  benzocaine 20% Spray 1 Spray(s) Topical four times a day PRN throat pain      PHYSICAL EXAM:    GENERAL: NAD    HEENT: NCAT    CHEST/LUNGS: CTAB    HEART: RRR,  No murmurs, rubs, or gallops    ABDOMEN: SNTND +BS    EXTREMITIES:  FROM, No clubbing, cyanosis, or edema, palpable pulse    NEURO: No focal neurological deficits    SKIN: No rashes or lesions    INCISION/WOUNDS:                          9.0    5.35  )-----------( 265      ( 2021 21:36 )             28.7        CBC Full  -  ( 2021 21:36 )  WBC Count : 5.35 K/uL  RBC Count : 3.10 M/uL  Hemoglobin : 9.0 g/dL  Hematocrit : 28.7 %  Platelet Count - Automated : 265 K/uL  Mean Cell Volume : 92.6 fL  Mean Cell Hemoglobin : 29.0 pg  Mean Cell Hemoglobin Concentration : 31.4 g/dL  Auto Neutrophil # : 3.87 K/uL  Auto Lymphocyte # : 0.35 K/uL  Auto Monocyte # : 0.69 K/uL  Auto Eosinophil # : 0.41 K/uL  Auto Basophil # : 0.01 K/uL  Auto Neutrophil % : 72.3 %  Auto Lymphocyte % : 6.5 %  Auto Monocyte % : 12.9 %  Auto Eosinophil % : 7.7 %  Auto Basophil % : 0.2 %               140   |  104   |  16                 Ca: 8.8    BMP:   ----------------------------< 90     M.2   (21 @ 07:55)             5.3    |  23    | 1.1                Ph: 3.3      LFT:     TPro: 5.7 / Alb: 3.6 / TBili: 0.4 / DBili: 0.2 / AST: 14 / ALT: 11 / AlkPhos: 90   (21 @ 15:56)            < from: CT Abdomen and Pelvis w/ Oral Cont (21 @ 11:41) >    IMPRESSION:    1.Demonstration of focal outpouching in the gastric antrum which fills with oral contrast, suggestive of contained perforation. The additional focus of perigastric extraluminal gas seen on prior CT not currently seen. Decreasing gastric and duodenal wall thickening and surrounding inflammatory change compared to prior CT.  2.  Redemonstration of indeterminate 6.4 cm left renal lesion. Recommend evaluation with outpatient contrast-enhanced MRI.          < end of copied text >

## 2021-04-21 NOTE — PROGRESS NOTE ADULT - SUBJECTIVE AND OBJECTIVE BOX
Hepatology/GI follow up note: Pt seen and examined at bedside.     For questions and inquiries please page [phone].  For urgent matters or after 5pm and on weekends please page the fellow on call through the GI paging system.    80y Male seen for: a contained duodenal perf    Subjective/Interval events:  SP EGD on 4/19: Giant ulcer occupying almost the entirety of the posterior wall of the bulb with a perf that is contained by what appeared to the pancreatic head. Biopsies of the bas of the ulcer/possibly pancreas and biopsies of the antrum and body were obtained  Awaiting Path  SP CT with PO con: contained "gastric antrum perf"  NPO    Review of system  General:  (-) weight loss, (-) fevers  Eyes:  (-) visual changes  CV:  (-) chest pain  Resp: (-) SOB, (-) wheezing  GI: (-) abdominal pain,  (-) nausea, (-) vomiting, (-) dysphagia, (-) diarrhea, (-) constipation, (-) rectal bleeding, (-) melena, (-) hematemesis.  Neuro: (-) confusion, (-) weakness  Psych:  (-) Hallucinations  Heme:  (-) easy bruisability    Past medical/surgical Hx:  PAST MEDICAL & SURGICAL HISTORY:  CAD (coronary artery disease)    GERD (gastroesophageal reflux disease)      Home Medications:  Last Order Reconciliation Date: 04-14-21 @ 01:59 (Admission Reconciliation)  aspirin 81 mg oral tablet, chewable: 1 tab(s) orally once a day  atorvastatin 80 mg oral tablet: 1 tab(s) orally once a day  iron polysaccharide (as elemental iron) 60 mg oral capsule: 1 tab(s) orally 2 times a day  metoprolol succinate 25 mg oral tablet, extended release: 1 tab(s) orally 2 times a day  ranolazine 500 mg oral tablet, extended release: 1 tab(s) orally 2 times a day  Vitamin D3 2000 intl units (50 mcg) oral tablet: 1 tab(s) orally 2 times a day      Allergies:  No Known Allergies      Current Medications:   benzocaine 20% Spray 1 Spray(s) Topical four times a day PRN  chlorhexidine 4% Liquid 1 Application(s) Topical <User Schedule>  heparin   Injectable 5000 Unit(s) SubCutaneous every 8 hours  iohexol 300 mG (iodine)/mL Oral Solution 30 milliLiter(s) Oral once  metoprolol tartrate Injectable 5 milliGRAM(s) IV Push every 6 hours  pantoprazole  Injectable 40 milliGRAM(s) IV Push two times a day  Parenteral Nutrition - Adult 1 Each TPN Continuous <Continuous>  Parenteral Nutrition - Adult 1 Each TPN Continuous <Continuous>  sucralfate 1 Gram(s) Oral two times a day        Physical exam:  T(C): 36.6 (04-21-21 @ 21:42), Max: 36.6 (04-21-21 @ 21:42)  HR: 84 (04-21-21 @ 21:42) (63 - 84)  BP: 122/67 (04-21-21 @ 21:42) (122/67 - 151/69)  RR: 18 (04-21-21 @ 21:42) (18 - 18)  SpO2: 96% (04-21-21 @ 11:54) (96% - 97%)    GENERAL: NAD  HEAD:  Atraumatic, Normocephalic  EYES: Sclera:NL  NECK: Supple, no JVD or thyromegaly  CHEST/LUNG: Good bilateral air entry  HEART: normal S1, S2. Regular  ABDOMEN: (-) distended, (-) tender, (-) rebound, (+) BS, (-)HSM  EXTREMITIES: (-) edema  NEUROLOGY: (-) asterixis  SKIN: (-) jaundice  NESTOR: (-) melena (-) brbpr      Data:                        10.1   5.90  )-----------( 315      ( 21 Apr 2021 20:00 )             32.0     MCV 90.9 (04-21-21)    RDW 16.7 (04-21-21)    HGB trend:  10.1  04-21-21 @ 20:00  9.0  04-19-21 @ 21:36  9.0  04-19-21 @ 07:36        WBC trend:  5.90  04-21-21 @ 20:00  5.35  04-19-21 @ 21:36  6.22  04-19-21 @ 07:36    04-21    135  |  101  |  28<H>  ----------------------------<  94  5.4<H>   |  21  |  1.4    Ca    9.0      21 Apr 2021 20:00  Phos  4.6     04-21  Mg     2.3     04-21      Liver panel trend:  TBili 0.4   /   AST 14   /   ALT 11   /   AlkP 90   /   Tptn 5.7   /   Alb 3.6    /   DBili 0.2      04-13

## 2021-04-22 LAB
ANION GAP SERPL CALC-SCNC: 10 MMOL/L — SIGNIFICANT CHANGE UP (ref 7–14)
BUN SERPL-MCNC: 29 MG/DL — HIGH (ref 10–20)
CALCIUM SERPL-MCNC: 8.8 MG/DL — SIGNIFICANT CHANGE UP (ref 8.5–10.1)
CHLORIDE SERPL-SCNC: 104 MMOL/L — SIGNIFICANT CHANGE UP (ref 98–110)
CO2 SERPL-SCNC: 23 MMOL/L — SIGNIFICANT CHANGE UP (ref 17–32)
CREAT SERPL-MCNC: 1.2 MG/DL — SIGNIFICANT CHANGE UP (ref 0.7–1.5)
GLUCOSE BLDC GLUCOMTR-MCNC: 140 MG/DL — HIGH (ref 70–99)
GLUCOSE SERPL-MCNC: 103 MG/DL — HIGH (ref 70–99)
MAGNESIUM SERPL-MCNC: 2.2 MG/DL — SIGNIFICANT CHANGE UP (ref 1.8–2.4)
PHOSPHATE SERPL-MCNC: 4.4 MG/DL — SIGNIFICANT CHANGE UP (ref 2.1–4.9)
POTASSIUM SERPL-MCNC: 4.2 MMOL/L — SIGNIFICANT CHANGE UP (ref 3.5–5)
POTASSIUM SERPL-MCNC: 4.3 MMOL/L — SIGNIFICANT CHANGE UP (ref 3.5–5)
POTASSIUM SERPL-SCNC: 4.2 MMOL/L — SIGNIFICANT CHANGE UP (ref 3.5–5)
POTASSIUM SERPL-SCNC: 4.3 MMOL/L — SIGNIFICANT CHANGE UP (ref 3.5–5)
SODIUM SERPL-SCNC: 137 MMOL/L — SIGNIFICANT CHANGE UP (ref 135–146)
SURGICAL PATHOLOGY STUDY: SIGNIFICANT CHANGE UP
TRIGL SERPL-MCNC: 105 MG/DL — SIGNIFICANT CHANGE UP

## 2021-04-22 PROCEDURE — 36573 INSJ PICC RS&I 5 YR+: CPT

## 2021-04-22 RX ORDER — I.V. FAT EMULSION 20 G/100ML
1.25 EMULSION INTRAVENOUS
Qty: 100.05 | Refills: 0 | Status: DISCONTINUED | OUTPATIENT
Start: 2021-04-22 | End: 2021-04-22

## 2021-04-22 RX ORDER — SODIUM CHLORIDE 9 MG/ML
500 INJECTION INTRAMUSCULAR; INTRAVENOUS; SUBCUTANEOUS ONCE
Refills: 0 | Status: COMPLETED | OUTPATIENT
Start: 2021-04-22 | End: 2021-04-22

## 2021-04-22 RX ORDER — ELECTROLYTE SOLUTION,INJ
1 VIAL (ML) INTRAVENOUS
Refills: 0 | Status: DISCONTINUED | OUTPATIENT
Start: 2021-04-22 | End: 2021-04-22

## 2021-04-22 RX ORDER — SODIUM CHLORIDE 9 MG/ML
1000 INJECTION, SOLUTION INTRAVENOUS
Refills: 0 | Status: DISCONTINUED | OUTPATIENT
Start: 2021-04-22 | End: 2021-04-22

## 2021-04-22 RX ADMIN — I.V. FAT EMULSION 31.3 GM/KG/DAY: 20 EMULSION INTRAVENOUS at 20:28

## 2021-04-22 RX ADMIN — Medication 5 MILLIGRAM(S): at 12:30

## 2021-04-22 RX ADMIN — HEPARIN SODIUM 5000 UNIT(S): 5000 INJECTION INTRAVENOUS; SUBCUTANEOUS at 21:00

## 2021-04-22 RX ADMIN — SODIUM CHLORIDE 100 MILLILITER(S): 9 INJECTION, SOLUTION INTRAVENOUS at 14:23

## 2021-04-22 RX ADMIN — HEPARIN SODIUM 5000 UNIT(S): 5000 INJECTION INTRAVENOUS; SUBCUTANEOUS at 13:23

## 2021-04-22 RX ADMIN — Medication 1 GRAM(S): at 06:00

## 2021-04-22 RX ADMIN — Medication 1 GRAM(S): at 18:03

## 2021-04-22 RX ADMIN — Medication 70 EACH: at 00:37

## 2021-04-22 RX ADMIN — PANTOPRAZOLE SODIUM 40 MILLIGRAM(S): 20 TABLET, DELAYED RELEASE ORAL at 18:02

## 2021-04-22 RX ADMIN — HEPARIN SODIUM 5000 UNIT(S): 5000 INJECTION INTRAVENOUS; SUBCUTANEOUS at 05:59

## 2021-04-22 RX ADMIN — SODIUM CHLORIDE 1000 MILLILITER(S): 9 INJECTION INTRAMUSCULAR; INTRAVENOUS; SUBCUTANEOUS at 06:30

## 2021-04-22 RX ADMIN — Medication 5 MILLIGRAM(S): at 20:28

## 2021-04-22 RX ADMIN — PANTOPRAZOLE SODIUM 40 MILLIGRAM(S): 20 TABLET, DELAYED RELEASE ORAL at 06:03

## 2021-04-22 RX ADMIN — Medication 1 EACH: at 20:28

## 2021-04-22 RX ADMIN — CHLORHEXIDINE GLUCONATE 1 APPLICATION(S): 213 SOLUTION TOPICAL at 06:00

## 2021-04-22 NOTE — PROGRESS NOTE ADULT - SUBJECTIVE AND OBJECTIVE BOX
Progress Note: General Surgery  Patient: KELLY DELGADO , 80y (1940)Male   MRN: [id number]  Location: [address]  Visit: 04-14-21 Inpatient  Date: 04-22-21 @ 14:43    Admit Diagnosis/Chief Complaint:     Procedure/Diagnosis:  S/P  POD# ***    Events/ 24h: No acute events overnight. Pain controlled.    Vitals: T(F): 98.3 (04-22-21 @ 13:20), Max: 98.3 (04-22-21 @ 13:20)  HR: 64 (04-22-21 @ 13:20)  BP: 134/64 (04-22-21 @ 13:20) (109/55 - 146/68)  RR: 18 (04-22-21 @ 13:20)  SpO2: 98% (04-22-21 @ 13:20)    In:   04-22-21 @ 07:01  -  04-22-21 @ 14:43  --------------------------------------------------------  IN: 0 mL      Out:   04-22-21 @ 07:01  -  04-22-21 @ 14:43  --------------------------------------------------------  OUT:    Voided (mL): 850 mL  Total OUT: 850 mL        Net:   04-22-21 @ 07:01  -  04-22-21 @ 14:43  --------------------------------------------------------  NET: -850 mL        Diet: Diet, NPO:   Except Medications  With Chewing Gum  With Hard Candy  With Ice Chips/Sips of Water (04-21-21 @ 16:47)    IV Fluids: dextrose 5% + sodium chloride 0.9%. 1000 milliLiter(s) (100 mL/Hr) IV Continuous <Continuous>  fat emulsion (Fish Oil and Plant Based) 20% Infusion 1.246 Gm/kG/Day (31.3 mL/Hr) IV Continuous <Continuous>  Parenteral Nutrition - Adult 1 Each (70 mL/Hr) TPN Continuous <Continuous>  Parenteral Nutrition - Adult 1 Each (80 mL/Hr) TPN Continuous <Continuous>      Physical Examination:  General Appearance: NAD   HEENT: EOMI, sclera anicteric.  Heart: RRR   Lungs: Symmetric chest wall expansion, equal rise and fall.  Abdomen:  Soft, nontender, nondistended.   MSK/Extremities: Warm & well-perfused.   Skin: Warm, dry. No jaundice.         Medications: [Standing]  chlorhexidine 4% Liquid 1 Application(s) Topical <User Schedule>  dextrose 5% + sodium chloride 0.9%. 1000 milliLiter(s) (100 mL/Hr) IV Continuous <Continuous>  fat emulsion (Fish Oil and Plant Based) 20% Infusion 1.246 Gm/kG/Day (31.3 mL/Hr) IV Continuous <Continuous>  heparin   Injectable 5000 Unit(s) SubCutaneous every 8 hours  iohexol 300 mG (iodine)/mL Oral Solution 30 milliLiter(s) Oral once  metoprolol tartrate Injectable 5 milliGRAM(s) IV Push every 6 hours  pantoprazole  Injectable 40 milliGRAM(s) IV Push two times a day  Parenteral Nutrition - Adult 1 Each (70 mL/Hr) TPN Continuous <Continuous>  Parenteral Nutrition - Adult 1 Each (80 mL/Hr) TPN Continuous <Continuous>  sucralfate 1 Gram(s) Oral two times a day    DVT Prophylaxis: heparin   Injectable 5000 Unit(s) SubCutaneous every 8 hours    GI Prophylaxis: pantoprazole  Injectable 40 milliGRAM(s) IV Push two times a day    Antibiotics:   Anticoagulation:   Medications:[PRN]  benzocaine 20% Spray 1 Spray(s) Topical four times a day PRN      Labs:                        10.1   5.90  )-----------( 315      ( 21 Apr 2021 20:00 )             32.0     04-22    137  |  104  |  29<H>  ----------------------------<  103<H>  4.3   |  23  |  1.2    Ca    8.8      22 Apr 2021 07:21  Phos  4.4     04-22  Mg     2.2     04-22                  Urine/Micro:        Imaging:   ***  < from: CT Abdomen and Pelvis w/ Oral Cont (04.20.21 @ 11:41) >    IMPRESSION:    1.Demonstration of focal outpouching in the gastric antrum which fills with oral contrast, suggestive of contained perforation. The additional focus of perigastric extraluminal gas seen on prior CT not currently seen. Decreasing gastric and duodenal wall thickening and surrounding inflammatory change compared to prior CT.  2.  Redemonstration of indeterminate 6.4 cm left renal lesion. Recommend evaluation with outpatient contrast-enhanced MRI.    < end of copied text >

## 2021-04-22 NOTE — CHART NOTE - NSCHARTNOTEFT_GEN_A_CORE
Pt still awaiting PICC placement for TPN  TPN orders entered for last night but no central access, only peripheral access  d/w surgical team    T(F): 98.3 (04-22-21 @ 13:20), Max: 98.3 (04-22-21 @ 13:20)  HR: 64 (04-22-21 @ 13:20) (64 - 84)  BP: 134/64 (04-22-21 @ 13:20) (109/55 - 146/68)  RR: 18 (04-22-21 @ 13:20) (18 - 18)  SpO2: 98% (04-22-21 @ 13:20) (96% - 98%)    I&O's Detail    22 Apr 2021 07:01  -  22 Apr 2021 13:21  --------------------------------------------------------  IN:  Total IN: 0 mL    OUT:    Voided (mL): 850 mL  Total OUT: 850 mL    Total NET: -850 mL    04-22    137  |  104  |  29<H>  ----------------------------<  103<H>  4.3   |  23  |  1.2    Ca    8.8      22 Apr 2021 07:21  Phos  4.4     04-22  Mg     2.2     04-22                        10.1   5.90  )-----------( 315      ( 21 Apr 2021 20:00 )             32.0     CAPILLARY BLOOD GLUCOSE  POCT Blood Glucose.: 140 mg/dL (22 Apr 2021 12:08)     Diet, NPO:   Except Medications  With Chewing Gum  With Hard Candy  With Ice Chips/Sips of Water (04-21-21 @ 16:47)    ASSESSMENT  81 yo male Hx of CAD, CKD admitted for acute/subacute anemia with outside evidence of FELICITY. CT revealed suspicion for a possible distal antral ulceration with associated thickening around with concerns for contained perforation vs additional ulcers    PLAN  - please try to document I&O  - still awaiting PICC    - once PICC placed will start TPN    - cont PPN for now until PICC in place  - daily bmp, in phos, mg while on parenteral nutrition  -  for d/c planning for home TPN, [facility] to provide home care services for home TPN  - will follow  - on discharge, should be seen in ~ 2 weeks in outpt office - [phone]

## 2021-04-22 NOTE — PROGRESS NOTE ADULT - ASSESSMENT
Assessment:  80M w/ PMH of CKD 3 (baseline Cr ~1.7), HTN, HLD, GERD, known L kidney mass, pacemaker (non-functioning per pt) and CAD s/p 4-vessel CABG ([facility], 2011) who presented to the ED with 2 weeks of worsening abdominal pain found to have contained perforated gastric antrum ulcerPatient seen and examined at bedside. NAD.     Plan:  ***  Dispo planning  Continue PPN until PICC line is placed  Home with TPN  Follow up GI  NPO   Pain management   IV ABX   Heparin subq   - Follow PT/Physiatry  - Contact social work/case management for necessary patient needs.           Date/Time: 04-22-21 @ 14:43

## 2021-04-23 LAB
ANION GAP SERPL CALC-SCNC: 10 MMOL/L — SIGNIFICANT CHANGE UP (ref 7–14)
BUN SERPL-MCNC: 21 MG/DL — HIGH (ref 10–20)
CALCIUM SERPL-MCNC: 8.3 MG/DL — LOW (ref 8.5–10.1)
CHLORIDE SERPL-SCNC: 103 MMOL/L — SIGNIFICANT CHANGE UP (ref 98–110)
CO2 SERPL-SCNC: 23 MMOL/L — SIGNIFICANT CHANGE UP (ref 17–32)
CREAT SERPL-MCNC: 1.1 MG/DL — SIGNIFICANT CHANGE UP (ref 0.7–1.5)
GLUCOSE SERPL-MCNC: 122 MG/DL — HIGH (ref 70–99)
HCT VFR BLD CALC: 27.9 % — LOW (ref 42–52)
HGB BLD-MCNC: 8.8 G/DL — LOW (ref 14–18)
MAGNESIUM SERPL-MCNC: 1.8 MG/DL — SIGNIFICANT CHANGE UP (ref 1.8–2.4)
MCHC RBC-ENTMCNC: 28.6 PG — SIGNIFICANT CHANGE UP (ref 27–31)
MCHC RBC-ENTMCNC: 31.5 G/DL — LOW (ref 32–37)
MCV RBC AUTO: 90.6 FL — SIGNIFICANT CHANGE UP (ref 80–94)
NRBC # BLD: 0 /100 WBCS — SIGNIFICANT CHANGE UP (ref 0–0)
PHOSPHATE SERPL-MCNC: 2.9 MG/DL — SIGNIFICANT CHANGE UP (ref 2.1–4.9)
PLATELET # BLD AUTO: 278 K/UL — SIGNIFICANT CHANGE UP (ref 130–400)
POTASSIUM SERPL-MCNC: 3.9 MMOL/L — SIGNIFICANT CHANGE UP (ref 3.5–5)
POTASSIUM SERPL-SCNC: 3.9 MMOL/L — SIGNIFICANT CHANGE UP (ref 3.5–5)
RBC # BLD: 3.08 M/UL — LOW (ref 4.7–6.1)
RBC # FLD: 16.4 % — HIGH (ref 11.5–14.5)
SARS-COV-2 RNA SPEC QL NAA+PROBE: DETECTED
SODIUM SERPL-SCNC: 136 MMOL/L — SIGNIFICANT CHANGE UP (ref 135–146)
WBC # BLD: 4.59 K/UL — LOW (ref 4.8–10.8)
WBC # FLD AUTO: 4.59 K/UL — LOW (ref 4.8–10.8)

## 2021-04-23 RX ORDER — I.V. FAT EMULSION 20 G/100ML
1.25 EMULSION INTRAVENOUS
Qty: 93.2 | Refills: 0 | Status: DISCONTINUED | OUTPATIENT
Start: 2021-04-23 | End: 2021-04-23

## 2021-04-23 RX ORDER — ELECTROLYTE SOLUTION,INJ
1 VIAL (ML) INTRAVENOUS
Refills: 0 | Status: DISCONTINUED | OUTPATIENT
Start: 2021-04-23 | End: 2021-04-23

## 2021-04-23 RX ORDER — ELECTROLYTE SOLUTION,INJ
1 VIAL (ML) INTRAVENOUS
Refills: 0 | Status: DISCONTINUED | OUTPATIENT
Start: 2021-04-25 | End: 2021-04-25

## 2021-04-23 RX ORDER — METOPROLOL TARTRATE 50 MG
25 TABLET ORAL DAILY
Refills: 0 | Status: DISCONTINUED | OUTPATIENT
Start: 2021-04-23 | End: 2021-04-26

## 2021-04-23 RX ORDER — ELECTROLYTE SOLUTION,INJ
1 VIAL (ML) INTRAVENOUS
Refills: 0 | Status: DISCONTINUED | OUTPATIENT
Start: 2021-04-24 | End: 2021-04-24

## 2021-04-23 RX ADMIN — Medication 1 EACH: at 22:11

## 2021-04-23 RX ADMIN — HEPARIN SODIUM 5000 UNIT(S): 5000 INJECTION INTRAVENOUS; SUBCUTANEOUS at 14:30

## 2021-04-23 RX ADMIN — PANTOPRAZOLE SODIUM 40 MILLIGRAM(S): 20 TABLET, DELAYED RELEASE ORAL at 05:42

## 2021-04-23 RX ADMIN — Medication 5 MILLIGRAM(S): at 05:42

## 2021-04-23 RX ADMIN — I.V. FAT EMULSION 38.8 GM/KG/DAY: 20 EMULSION INTRAVENOUS at 22:10

## 2021-04-23 RX ADMIN — CHLORHEXIDINE GLUCONATE 1 APPLICATION(S): 213 SOLUTION TOPICAL at 05:42

## 2021-04-23 RX ADMIN — HEPARIN SODIUM 5000 UNIT(S): 5000 INJECTION INTRAVENOUS; SUBCUTANEOUS at 05:42

## 2021-04-23 RX ADMIN — HEPARIN SODIUM 5000 UNIT(S): 5000 INJECTION INTRAVENOUS; SUBCUTANEOUS at 21:31

## 2021-04-23 RX ADMIN — Medication 25 MILLIGRAM(S): at 17:13

## 2021-04-23 RX ADMIN — Medication 1 GRAM(S): at 05:42

## 2021-04-23 RX ADMIN — PANTOPRAZOLE SODIUM 40 MILLIGRAM(S): 20 TABLET, DELAYED RELEASE ORAL at 17:08

## 2021-04-23 RX ADMIN — Medication 1 GRAM(S): at 17:08

## 2021-04-23 NOTE — PROGRESS NOTE ADULT - SUBJECTIVE AND OBJECTIVE BOX
Progress Note: General Surgery  Patient: KELLY DELGADO , 80y (1940)Male   MRN: [id number]  Location: [address]  Visit: 04-14-21 Inpatient  Date: 04-23-21 @ 05:22    Admit Diagnosis/Chief Complaint:     Procedure/Diagnosis:  S/P     Events/ 24h: Patient seen and examined at bedside. No acute events overnight. Afebrile, VSS.    Vitals: T(F): 98 (04-22-21 @ 20:40), Max: 98.3 (04-22-21 @ 13:20)  HR: 66 (04-22-21 @ 23:34)  BP: 138/64 (04-22-21 @ 23:34) (134/64 - 153/69)  RR: 18 (04-22-21 @ 20:40)  SpO2: 97% (04-22-21 @ 20:40)    In:   04-22-21 @ 07:01  -  04-23-21 @ 05:22  --------------------------------------------------------  IN: 800 mL      Out:   04-22-21 @ 07:01  -  04-23-21 @ 05:22  --------------------------------------------------------  OUT:    Voided (mL): 1400 mL  Total OUT: 1400 mL        Net:   04-22-21 @ 07:01  -  04-23-21 @ 05:22  --------------------------------------------------------  NET: -600 mL        Diet: Diet, NPO:   Except Medications  With Chewing Gum  With Hard Candy  With Ice Chips/Sips of Water (04-21-21 @ 16:47)    IV Fluids: fat emulsion (Fish Oil and Plant Based) 20% Infusion 1.246 Gm/kG/Day (31.3 mL/Hr) IV Continuous <Continuous>  Parenteral Nutrition - Adult 1 Each (80 mL/Hr) TPN Continuous <Continuous>      Physical Examination:  General Appearance: NAD   HEENT: EOMI, sclera anicteric.  Heart: RRR   Lungs: Symmetric chest wall expansion, equal rise and fall.  Abdomen:  Soft, nontender, nondistended.   MSK/Extremities: Warm & well-perfused.   Skin: Warm, dry. No jaundice.       Medications: [Standing]  chlorhexidine 4% Liquid 1 Application(s) Topical <User Schedule>  fat emulsion (Fish Oil and Plant Based) 20% Infusion 1.246 Gm/kG/Day (31.3 mL/Hr) IV Continuous <Continuous>  heparin   Injectable 5000 Unit(s) SubCutaneous every 8 hours  iohexol 300 mG (iodine)/mL Oral Solution 30 milliLiter(s) Oral once  metoprolol tartrate Injectable 5 milliGRAM(s) IV Push every 6 hours  pantoprazole  Injectable 40 milliGRAM(s) IV Push two times a day  Parenteral Nutrition - Adult 1 Each (80 mL/Hr) TPN Continuous <Continuous>  sucralfate 1 Gram(s) Oral two times a day    DVT Prophylaxis: heparin   Injectable 5000 Unit(s) SubCutaneous every 8 hours    GI Prophylaxis: pantoprazole  Injectable 40 milliGRAM(s) IV Push two times a day    Antibiotics:   Anticoagulation:   Medications:[PRN]  benzocaine 20% Spray 1 Spray(s) Topical four times a day PRN      Labs:                        10.1   5.90  )-----------( 315      ( 21 Apr 2021 20:00 )             32.0     04-22    137  |  104  |  29<H>  ----------------------------<  103<H>  4.3   |  23  |  1.2    Ca    8.8      22 Apr 2021 07:21  Phos  4.4     04-22  Mg     2.2     04-22    < from: CT Abdomen and Pelvis w/ Oral Cont (04.20.21 @ 11:41) >    IMPRESSION:    1.Demonstration of focal outpouching in the gastric antrum which fills with oral contrast, suggestive of contained perforation. The additional focus of perigastric extraluminal gas seen on prior CT not currently seen. Decreasing gastric and duodenal wall thickening and surrounding inflammatory change compared to prior CT.  2.  Redemonstration of indeterminate 6.4 cm left renal lesion. Recommend evaluation with outpatient contrast-enhanced MRI.    < end of copied text >

## 2021-04-23 NOTE — PROGRESS NOTE ADULT - ASSESSMENT
Assessment:  80M w/ PMH of CKD 3 (baseline Cr ~1.7), HTN, HLD, GERD, known L kidney mass, pacemaker (non-functioning per pt) and CAD s/p 4-vessel CABG ([facility], 2011) who presented to the ED with 2 weeks of worsening abdominal pain found to have contained perforated gastric antrum ulcerPatient seen and examined at bedside. NAD.     Plan:  Home with TPN  Follow up GI  NPO   Pain management   IV ABX   - Contact social work/case management for necessary patient needs and dispo planning  - dc today if tpn is set up      Date/Time: 04-23-21 @ 05:22

## 2021-04-24 LAB
ANION GAP SERPL CALC-SCNC: 11 MMOL/L — SIGNIFICANT CHANGE UP (ref 7–14)
ANION GAP SERPL CALC-SCNC: 12 MMOL/L — SIGNIFICANT CHANGE UP (ref 7–14)
BASOPHILS # BLD AUTO: 0.01 K/UL — SIGNIFICANT CHANGE UP (ref 0–0.2)
BASOPHILS NFR BLD AUTO: 0.3 % — SIGNIFICANT CHANGE UP (ref 0–1)
BUN SERPL-MCNC: 24 MG/DL — HIGH (ref 10–20)
BUN SERPL-MCNC: 28 MG/DL — HIGH (ref 10–20)
CALCIUM SERPL-MCNC: 8.2 MG/DL — LOW (ref 8.5–10.1)
CALCIUM SERPL-MCNC: 8.5 MG/DL — SIGNIFICANT CHANGE UP (ref 8.5–10.1)
CHLORIDE SERPL-SCNC: 103 MMOL/L — SIGNIFICANT CHANGE UP (ref 98–110)
CHLORIDE SERPL-SCNC: 104 MMOL/L — SIGNIFICANT CHANGE UP (ref 98–110)
CO2 SERPL-SCNC: 19 MMOL/L — SIGNIFICANT CHANGE UP (ref 17–32)
CO2 SERPL-SCNC: 23 MMOL/L — SIGNIFICANT CHANGE UP (ref 17–32)
CREAT SERPL-MCNC: 1.2 MG/DL — SIGNIFICANT CHANGE UP (ref 0.7–1.5)
CREAT SERPL-MCNC: 1.4 MG/DL — SIGNIFICANT CHANGE UP (ref 0.7–1.5)
EOSINOPHIL # BLD AUTO: 0.05 K/UL — SIGNIFICANT CHANGE UP (ref 0–0.7)
EOSINOPHIL NFR BLD AUTO: 1.3 % — SIGNIFICANT CHANGE UP (ref 0–8)
GLUCOSE SERPL-MCNC: 112 MG/DL — HIGH (ref 70–99)
GLUCOSE SERPL-MCNC: 122 MG/DL — HIGH (ref 70–99)
HCT VFR BLD CALC: 28.3 % — LOW (ref 42–52)
HCT VFR BLD CALC: 30.5 % — LOW (ref 42–52)
HGB BLD-MCNC: 8.9 G/DL — LOW (ref 14–18)
HGB BLD-MCNC: 9.3 G/DL — LOW (ref 14–18)
IMM GRANULOCYTES NFR BLD AUTO: 0.3 % — SIGNIFICANT CHANGE UP (ref 0.1–0.3)
LYMPHOCYTES # BLD AUTO: 0.43 K/UL — LOW (ref 1.2–3.4)
LYMPHOCYTES # BLD AUTO: 11.1 % — LOW (ref 20.5–51.1)
MAGNESIUM SERPL-MCNC: 2 MG/DL — SIGNIFICANT CHANGE UP (ref 1.8–2.4)
MAGNESIUM SERPL-MCNC: 2 MG/DL — SIGNIFICANT CHANGE UP (ref 1.8–2.4)
MCHC RBC-ENTMCNC: 28.1 PG — SIGNIFICANT CHANGE UP (ref 27–31)
MCHC RBC-ENTMCNC: 28.5 PG — SIGNIFICANT CHANGE UP (ref 27–31)
MCHC RBC-ENTMCNC: 30.5 G/DL — LOW (ref 32–37)
MCHC RBC-ENTMCNC: 31.4 G/DL — LOW (ref 32–37)
MCV RBC AUTO: 90.7 FL — SIGNIFICANT CHANGE UP (ref 80–94)
MCV RBC AUTO: 92.1 FL — SIGNIFICANT CHANGE UP (ref 80–94)
MONOCYTES # BLD AUTO: 0.42 K/UL — SIGNIFICANT CHANGE UP (ref 0.1–0.6)
MONOCYTES NFR BLD AUTO: 10.9 % — HIGH (ref 1.7–9.3)
NEUTROPHILS # BLD AUTO: 2.95 K/UL — SIGNIFICANT CHANGE UP (ref 1.4–6.5)
NEUTROPHILS NFR BLD AUTO: 76.1 % — HIGH (ref 42.2–75.2)
NRBC # BLD: 0 /100 WBCS — SIGNIFICANT CHANGE UP (ref 0–0)
NRBC # BLD: 0 /100 WBCS — SIGNIFICANT CHANGE UP (ref 0–0)
PHOSPHATE SERPL-MCNC: 3.4 MG/DL — SIGNIFICANT CHANGE UP (ref 2.1–4.9)
PHOSPHATE SERPL-MCNC: 3.7 MG/DL — SIGNIFICANT CHANGE UP (ref 2.1–4.9)
PLATELET # BLD AUTO: 180 K/UL — SIGNIFICANT CHANGE UP (ref 130–400)
PLATELET # BLD AUTO: 285 K/UL — SIGNIFICANT CHANGE UP (ref 130–400)
POTASSIUM SERPL-MCNC: 4 MMOL/L — SIGNIFICANT CHANGE UP (ref 3.5–5)
POTASSIUM SERPL-MCNC: 4.7 MMOL/L — SIGNIFICANT CHANGE UP (ref 3.5–5)
POTASSIUM SERPL-SCNC: 4 MMOL/L — SIGNIFICANT CHANGE UP (ref 3.5–5)
POTASSIUM SERPL-SCNC: 4.7 MMOL/L — SIGNIFICANT CHANGE UP (ref 3.5–5)
RBC # BLD: 3.12 M/UL — LOW (ref 4.7–6.1)
RBC # BLD: 3.31 M/UL — LOW (ref 4.7–6.1)
RBC # FLD: 16.3 % — HIGH (ref 11.5–14.5)
RBC # FLD: 16.7 % — HIGH (ref 11.5–14.5)
SODIUM SERPL-SCNC: 134 MMOL/L — LOW (ref 135–146)
SODIUM SERPL-SCNC: 138 MMOL/L — SIGNIFICANT CHANGE UP (ref 135–146)
WBC # BLD: 3.87 K/UL — LOW (ref 4.8–10.8)
WBC # BLD: 4.6 K/UL — LOW (ref 4.8–10.8)
WBC # FLD AUTO: 3.87 K/UL — LOW (ref 4.8–10.8)
WBC # FLD AUTO: 4.6 K/UL — LOW (ref 4.8–10.8)

## 2021-04-24 RX ADMIN — Medication 25 MILLIGRAM(S): at 05:09

## 2021-04-24 RX ADMIN — Medication 1 GRAM(S): at 17:14

## 2021-04-24 RX ADMIN — HEPARIN SODIUM 5000 UNIT(S): 5000 INJECTION INTRAVENOUS; SUBCUTANEOUS at 14:19

## 2021-04-24 RX ADMIN — Medication 1 EACH: at 20:39

## 2021-04-24 RX ADMIN — HEPARIN SODIUM 5000 UNIT(S): 5000 INJECTION INTRAVENOUS; SUBCUTANEOUS at 05:09

## 2021-04-24 RX ADMIN — PANTOPRAZOLE SODIUM 40 MILLIGRAM(S): 20 TABLET, DELAYED RELEASE ORAL at 17:14

## 2021-04-24 RX ADMIN — HEPARIN SODIUM 5000 UNIT(S): 5000 INJECTION INTRAVENOUS; SUBCUTANEOUS at 20:40

## 2021-04-24 RX ADMIN — PANTOPRAZOLE SODIUM 40 MILLIGRAM(S): 20 TABLET, DELAYED RELEASE ORAL at 05:11

## 2021-04-24 RX ADMIN — CHLORHEXIDINE GLUCONATE 1 APPLICATION(S): 213 SOLUTION TOPICAL at 05:12

## 2021-04-24 RX ADMIN — Medication 1 GRAM(S): at 05:09

## 2021-04-24 NOTE — PROGRESS NOTE ADULT - SUBJECTIVE AND OBJECTIVE BOX
Progress Note: General Surgery  Patient: KELLY DELGADO , 80y (1940)Male   MRN: [id number]  Location: [address]  Visit: 04-14-21 Inpatient  Date: 04-24-21 @ 05:19    Admit Diagnosis/Chief Complaint: abdominal pain    Procedure/Diagnosis: contained perforated gastric ulcer, stable no  intervention    Events/ 24h: No acute events overnight. Pain controlled.    Vitals: T(F): 99.3 (04-23-21 @ 20:32), Max: 99.3 (04-23-21 @ 20:32)  HR: 81 (04-23-21 @ 20:32)  BP: 117/57 (04-23-21 @ 20:32) (112/70 - 145/60)  RR: 18 (04-23-21 @ 20:32)  SpO2: 100% (04-23-21 @ 20:32)    In:   04-22-21 @ 07:01  -  04-23-21 @ 07:00  --------------------------------------------------------  IN: 800 mL    04-23-21 @ 07:01  -  04-24-21 @ 05:19  --------------------------------------------------------  IN: 0 mL      Out:   04-22-21 @ 07:01  -  04-23-21 @ 07:00  --------------------------------------------------------  OUT:    Voided (mL): 1400 mL  Total OUT: 1400 mL      04-23-21 @ 07:01  -  04-24-21 @ 05:19  --------------------------------------------------------  OUT:    Voided (mL): 300 mL  Total OUT: 300 mL        Net:   04-22-21 @ 07:01  -  04-23-21 @ 07:00  --------------------------------------------------------  NET: -600 mL    04-23-21 @ 07:01  -  04-24-21 @ 05:19  --------------------------------------------------------  NET: -300 mL        Diet: Diet, NPO:   Except Medications  With Chewing Gum  With Hard Candy  With Ice Chips/Sips of Water (04-21-21 @ 16:47)    IV Fluids: fat emulsion (Fish Oil and Plant Based) 20% Infusion 1.246 Gm/kG/Day (38.8 mL/Hr) IV Continuous <Continuous>  Parenteral Nutrition - Adult 1 Each TPN Continuous <Continuous>  Parenteral Nutrition - Adult 1 Each TPN Continuous <Continuous>      Physical Examination:  General Appearance: NAD   HEENT: EOMI, sclera anicteric.  Heart: RRR   Lungs: Symmetric chest wall expansion, equal rise and fall.  Abdomen:  Soft, nontender, nondistended.   MSK/Extremities: Warm & well-perfused.   Skin: Warm, dry. No jaundice.         Medications: [Standing]  chlorhexidine 4% Liquid 1 Application(s) Topical <User Schedule>  fat emulsion (Fish Oil and Plant Based) 20% Infusion 1.246 Gm/kG/Day (38.8 mL/Hr) IV Continuous <Continuous>  heparin   Injectable 5000 Unit(s) SubCutaneous every 8 hours  iohexol 300 mG (iodine)/mL Oral Solution 30 milliLiter(s) Oral once  metoprolol succinate ER 25 milliGRAM(s) Oral daily  pantoprazole  Injectable 40 milliGRAM(s) IV Push two times a day  Parenteral Nutrition - Adult 1 Each TPN Continuous <Continuous>  Parenteral Nutrition - Adult 1 Each TPN Continuous <Continuous>  sucralfate 1 Gram(s) Oral two times a day    DVT Prophylaxis: heparin   Injectable 5000 Unit(s) SubCutaneous every 8 hours    GI Prophylaxis: pantoprazole  Injectable 40 milliGRAM(s) IV Push two times a day    Antibiotics:   Anticoagulation:   Medications:[PRN]  benzocaine 20% Spray 1 Spray(s) Topical four times a day PRN      Labs:                        8.8    4.59  )-----------( 278      ( 23 Apr 2021 07:29 )             27.9     04-23    136  |  103  |  21<H>  ----------------------------<  122<H>  3.9   |  23  |  1.1    Ca    8.3<L>      23 Apr 2021 07:29  Phos  2.9     04-23  Mg     1.8     04-23                  Urine/Micro:        Imaging:   ***    < from: CT Abdomen and Pelvis w/ Oral Cont (04.20.21 @ 11:41) >  IMPRESSION:    1.Demonstration of focal outpouching in the gastric antrum which fills with oral contrast, suggestive of contained perforation. The additional focus of perigastric extraluminal gas seen on prior CT not currently seen. Decreasing gastric and duodenal wall thickening and surrounding inflammatory change compared to prior CT.  2.  Redemonstration of indeterminate 6.4 cm left renal lesion. Recommend evaluation with outpatient contrast-enhanced MRI.    < end of copied text >

## 2021-04-24 NOTE — PROGRESS NOTE ADULT - ASSESSMENT
Assessment:  80M w/ PMH of CKD 3 (baseline Cr ~1.7), HTN, HLD, GERD, known L kidney mass, pacemaker (non-functioning per pt) and CAD s/p 4-vessel CABG ([facility], 2011) who presented to the ED with 2 weeks of worsening abdominal pain found to have contained perforated gastric antrum ulcerPatient seen and examined at bedside. NAD.     Plan:  Home with TPN likely discharge by Monday once TPN is titrated to proper dosing  NPO except meds  start CLD once TPN has begun  Pain management   IV ABX   - Contact social work/case management for necessary patient needs and dispo planning          Date/Time: 04-24-21 @ 05:19

## 2021-04-25 LAB
ANION GAP SERPL CALC-SCNC: 9 MMOL/L — SIGNIFICANT CHANGE UP (ref 7–14)
BUN SERPL-MCNC: 29 MG/DL — HIGH (ref 10–20)
CALCIUM SERPL-MCNC: 8.2 MG/DL — LOW (ref 8.5–10.1)
CHLORIDE SERPL-SCNC: 105 MMOL/L — SIGNIFICANT CHANGE UP (ref 98–110)
CO2 SERPL-SCNC: 23 MMOL/L — SIGNIFICANT CHANGE UP (ref 17–32)
CREAT SERPL-MCNC: 1 MG/DL — SIGNIFICANT CHANGE UP (ref 0.7–1.5)
GLUCOSE SERPL-MCNC: 105 MG/DL — HIGH (ref 70–99)
HCT VFR BLD CALC: 27.4 % — LOW (ref 42–52)
HGB BLD-MCNC: 8.7 G/DL — LOW (ref 14–18)
MCHC RBC-ENTMCNC: 28.6 PG — SIGNIFICANT CHANGE UP (ref 27–31)
MCHC RBC-ENTMCNC: 31.8 G/DL — LOW (ref 32–37)
MCV RBC AUTO: 90.1 FL — SIGNIFICANT CHANGE UP (ref 80–94)
NRBC # BLD: 0 /100 WBCS — SIGNIFICANT CHANGE UP (ref 0–0)
PLATELET # BLD AUTO: 262 K/UL — SIGNIFICANT CHANGE UP (ref 130–400)
POTASSIUM SERPL-MCNC: 4.7 MMOL/L — SIGNIFICANT CHANGE UP (ref 3.5–5)
POTASSIUM SERPL-SCNC: 4.7 MMOL/L — SIGNIFICANT CHANGE UP (ref 3.5–5)
RBC # BLD: 3.04 M/UL — LOW (ref 4.7–6.1)
RBC # FLD: 16.4 % — HIGH (ref 11.5–14.5)
SODIUM SERPL-SCNC: 137 MMOL/L — SIGNIFICANT CHANGE UP (ref 135–146)
WBC # BLD: 3.69 K/UL — LOW (ref 4.8–10.8)
WBC # FLD AUTO: 3.69 K/UL — LOW (ref 4.8–10.8)

## 2021-04-25 PROCEDURE — 93970 EXTREMITY STUDY: CPT | Mod: 26

## 2021-04-25 RX ORDER — SODIUM CHLORIDE 9 MG/ML
500 INJECTION, SOLUTION INTRAVENOUS ONCE
Refills: 0 | Status: COMPLETED | OUTPATIENT
Start: 2021-04-25 | End: 2021-04-25

## 2021-04-25 RX ORDER — SODIUM CHLORIDE 9 MG/ML
500 INJECTION INTRAMUSCULAR; INTRAVENOUS; SUBCUTANEOUS ONCE
Refills: 0 | Status: COMPLETED | OUTPATIENT
Start: 2021-04-25 | End: 2021-04-25

## 2021-04-25 RX ADMIN — SODIUM CHLORIDE 1000 MILLILITER(S): 9 INJECTION INTRAMUSCULAR; INTRAVENOUS; SUBCUTANEOUS at 08:31

## 2021-04-25 RX ADMIN — Medication 25 MILLIGRAM(S): at 05:49

## 2021-04-25 RX ADMIN — Medication 1 GRAM(S): at 05:49

## 2021-04-25 RX ADMIN — CHLORHEXIDINE GLUCONATE 1 APPLICATION(S): 213 SOLUTION TOPICAL at 05:50

## 2021-04-25 RX ADMIN — HEPARIN SODIUM 5000 UNIT(S): 5000 INJECTION INTRAVENOUS; SUBCUTANEOUS at 05:49

## 2021-04-25 RX ADMIN — SODIUM CHLORIDE 500 MILLILITER(S): 9 INJECTION, SOLUTION INTRAVENOUS at 02:57

## 2021-04-25 RX ADMIN — PANTOPRAZOLE SODIUM 40 MILLIGRAM(S): 20 TABLET, DELAYED RELEASE ORAL at 17:24

## 2021-04-25 RX ADMIN — PANTOPRAZOLE SODIUM 40 MILLIGRAM(S): 20 TABLET, DELAYED RELEASE ORAL at 05:49

## 2021-04-25 RX ADMIN — Medication 1 EACH: at 21:08

## 2021-04-25 RX ADMIN — HEPARIN SODIUM 5000 UNIT(S): 5000 INJECTION INTRAVENOUS; SUBCUTANEOUS at 21:09

## 2021-04-25 RX ADMIN — HEPARIN SODIUM 5000 UNIT(S): 5000 INJECTION INTRAVENOUS; SUBCUTANEOUS at 13:31

## 2021-04-25 RX ADMIN — Medication 1 GRAM(S): at 17:24

## 2021-04-25 NOTE — PROGRESS NOTE ADULT - SUBJECTIVE AND OBJECTIVE BOX
GENERAL SURGERY PROGRESS NOTE     DANNY KELLY  [address] [place] [address] day :11d  POD:  Procedure:   Surgical Attending: Wesley Domingo  Overnight events: No acute events overnight. Patient found in NAD.     T(F): 98.2 (04-24-21 @ 23:17), Max: 98.4 (04-24-21 @ 05:28)  HR: 75 (04-24-21 @ 23:17) (67 - 75)  BP: 135/65 (04-24-21 @ 23:17) (135/65 - 148/63)  ABP: --  ABP(mean): --  RR: 18 (04-24-21 @ 23:17) (18 - 19)  SpO2: 99% (04-24-21 @ 23:17) (99% - 99%)      04-23-21 @ 07:01  -  04-24-21 @ 07:00  --------------------------------------------------------  IN:  Total IN: 0 mL    OUT:    Voided (mL): 600 mL  Total OUT: 600 mL    Total NET: -600 mL      04-24-21 @ 07:01  -  04-25-21 @ 02:51  --------------------------------------------------------  IN:  Total IN: 0 mL    OUT:    Voided (mL): 600 mL  Total OUT: 600 mL    Total NET: -600 mL        DIET/FLUIDS: Parenteral Nutrition - Adult 1 Each TPN Continuous <Continuous>  Parenteral Nutrition - Adult 1 Each TPN Continuous <Continuous>     GI proph:  pantoprazole  Injectable 40 milliGRAM(s) IV Push two times a day    AC/ proph: heparin   Injectable 5000 Unit(s) SubCutaneous every 8 hours    ABx:     LABS  Labs:  CAPILLARY BLOOD GLUCOSE                        9.3    3.87  )-----------( 180      ( 24 Apr 2021 22:25 )             30.5       Auto Neutrophil %: 76.1 % (04-24-21 @ 22:25)  Auto Immature Granulocyte %: 0.3 % (04-24-21 @ 22:25)    04-24    134<L>  |  104  |  28<H>  ----------------------------<  122<H>  4.7   |  19  |  1.4      Calcium, Total Serum: 8.5 mg/dL (04-24-21 @ 22:25)    RADIOLOGY & ADDITIONAL TESTS:

## 2021-04-25 NOTE — PROGRESS NOTE ADULT - ASSESSMENT
A/P:  KELLY DELGADO is a 80yMale patient being kept over weekent for TPN optimization as per nutrition team. planned for possible DC on monday.    Plan:   continue nutrition optimization  vitals  labs  OOB

## 2021-04-26 VITALS
SYSTOLIC BLOOD PRESSURE: 129 MMHG | HEART RATE: 64 BPM | DIASTOLIC BLOOD PRESSURE: 75 MMHG | TEMPERATURE: 98 F | RESPIRATION RATE: 18 BRPM | OXYGEN SATURATION: 97 %

## 2021-04-26 LAB
ANION GAP SERPL CALC-SCNC: 6 MMOL/L — LOW (ref 7–14)
BASOPHILS # BLD AUTO: 0.01 K/UL — SIGNIFICANT CHANGE UP (ref 0–0.2)
BASOPHILS NFR BLD AUTO: 0.3 % — SIGNIFICANT CHANGE UP (ref 0–1)
BUN SERPL-MCNC: 30 MG/DL — HIGH (ref 10–20)
CALCIUM SERPL-MCNC: 8.4 MG/DL — LOW (ref 8.5–10.1)
CHLORIDE SERPL-SCNC: 104 MMOL/L — SIGNIFICANT CHANGE UP (ref 98–110)
CO2 SERPL-SCNC: 26 MMOL/L — SIGNIFICANT CHANGE UP (ref 17–32)
CREAT SERPL-MCNC: 1.1 MG/DL — SIGNIFICANT CHANGE UP (ref 0.7–1.5)
EOSINOPHIL # BLD AUTO: 0.13 K/UL — SIGNIFICANT CHANGE UP (ref 0–0.7)
EOSINOPHIL NFR BLD AUTO: 3.6 % — SIGNIFICANT CHANGE UP (ref 0–8)
GLUCOSE SERPL-MCNC: 106 MG/DL — HIGH (ref 70–99)
HCT VFR BLD CALC: 27.8 % — LOW (ref 42–52)
HGB BLD-MCNC: 8.8 G/DL — LOW (ref 14–18)
IMM GRANULOCYTES NFR BLD AUTO: 0.3 % — SIGNIFICANT CHANGE UP (ref 0.1–0.3)
LYMPHOCYTES # BLD AUTO: 0.4 K/UL — LOW (ref 1.2–3.4)
LYMPHOCYTES # BLD AUTO: 11.1 % — LOW (ref 20.5–51.1)
MAGNESIUM SERPL-MCNC: 2 MG/DL — SIGNIFICANT CHANGE UP (ref 1.8–2.4)
MCHC RBC-ENTMCNC: 28.4 PG — SIGNIFICANT CHANGE UP (ref 27–31)
MCHC RBC-ENTMCNC: 31.7 G/DL — LOW (ref 32–37)
MCV RBC AUTO: 89.7 FL — SIGNIFICANT CHANGE UP (ref 80–94)
MONOCYTES # BLD AUTO: 0.38 K/UL — SIGNIFICANT CHANGE UP (ref 0.1–0.6)
MONOCYTES NFR BLD AUTO: 10.5 % — HIGH (ref 1.7–9.3)
NEUTROPHILS # BLD AUTO: 2.68 K/UL — SIGNIFICANT CHANGE UP (ref 1.4–6.5)
NEUTROPHILS NFR BLD AUTO: 74.2 % — SIGNIFICANT CHANGE UP (ref 42.2–75.2)
NRBC # BLD: 0 /100 WBCS — SIGNIFICANT CHANGE UP (ref 0–0)
PHOSPHATE SERPL-MCNC: 3.5 MG/DL — SIGNIFICANT CHANGE UP (ref 2.1–4.9)
PLATELET # BLD AUTO: 269 K/UL — SIGNIFICANT CHANGE UP (ref 130–400)
POTASSIUM SERPL-MCNC: 4.9 MMOL/L — SIGNIFICANT CHANGE UP (ref 3.5–5)
POTASSIUM SERPL-SCNC: 4.9 MMOL/L — SIGNIFICANT CHANGE UP (ref 3.5–5)
RBC # BLD: 3.1 M/UL — LOW (ref 4.7–6.1)
RBC # FLD: 16.3 % — HIGH (ref 11.5–14.5)
SODIUM SERPL-SCNC: 136 MMOL/L — SIGNIFICANT CHANGE UP (ref 135–146)
WBC # BLD: 3.61 K/UL — LOW (ref 4.8–10.8)
WBC # FLD AUTO: 3.61 K/UL — LOW (ref 4.8–10.8)

## 2021-04-26 RX ORDER — ELECTROLYTE SOLUTION,INJ
1 VIAL (ML) INTRAVENOUS
Qty: 0 | Refills: 0 | DISCHARGE
Start: 2021-04-26

## 2021-04-26 RX ORDER — PANTOPRAZOLE SODIUM 20 MG/1
1 TABLET, DELAYED RELEASE ORAL
Qty: 1 | Refills: 3
Start: 2021-04-26 | End: 2021-08-23

## 2021-04-26 RX ADMIN — Medication 25 MILLIGRAM(S): at 05:44

## 2021-04-26 RX ADMIN — CHLORHEXIDINE GLUCONATE 1 APPLICATION(S): 213 SOLUTION TOPICAL at 05:45

## 2021-04-26 RX ADMIN — PANTOPRAZOLE SODIUM 40 MILLIGRAM(S): 20 TABLET, DELAYED RELEASE ORAL at 05:44

## 2021-04-26 RX ADMIN — HEPARIN SODIUM 5000 UNIT(S): 5000 INJECTION INTRAVENOUS; SUBCUTANEOUS at 05:44

## 2021-04-26 RX ADMIN — HEPARIN SODIUM 5000 UNIT(S): 5000 INJECTION INTRAVENOUS; SUBCUTANEOUS at 13:28

## 2021-04-26 RX ADMIN — Medication 1 GRAM(S): at 05:44

## 2021-04-26 NOTE — DISCHARGE NOTE NURSING/CASE MANAGEMENT/SOCIAL WORK - PATIENT PORTAL LINK FT
You can access the FollowMyHealth Patient Portal offered by [facility] by registering at the following website: [email or web address]. By joining [facility]’s FollowMyHealth portal, you will also be able to view your health information using other applications (apps) compatible with our system.

## 2021-04-26 NOTE — CHART NOTE - NSCHARTNOTEFT_GEN_A_CORE
Patient's son Narayan PappasJR maverick updated with patient status, all questions were answered. reviewed labs and imaging with him.   let son know we are waiting on authorization from insurance company and then will DC patient

## 2021-04-26 NOTE — DISCHARGE NOTE PROVIDER - CARE PROVIDERS DIRECT ADDRESSES
,[email or web address],DirectAddress_Unknown,DirectAddress_Unknown

## 2021-04-26 NOTE — DISCHARGE NOTE PROVIDER - PROVIDER TOKENS
PROVIDER:[TOKEN:[[id number]],FOLLOWUP:[2 weeks]],PROVIDER:[TOKEN:[[id number]]],PROVIDER:[TOKEN:[[id number]],FOLLOWUP:[2 weeks]]

## 2021-04-26 NOTE — PROGRESS NOTE ADULT - SUBJECTIVE AND OBJECTIVE BOX
Progress Note: General Surgery  Patient: KELLY DELGADO , 80y (1940)Male   MRN: [id number]  Location: [address]  Visit: 04-14-21 Inpatient  Date: 04-26-21 @ 08:06    Admit Diagnosis/Chief Complaint: contained gastric ulcer    Surgical Attending: Wesley Domingo  Overnight events: No acute events overnight. Patient found in NAD.       Vitals: T(F): 97.3 (04-26-21 @ 06:36), Max: 98.5 (04-25-21 @ 21:00)  HR: 68 (04-26-21 @ 06:36)  BP: 125/61 (04-26-21 @ 06:36) (125/61 - 141/64)  RR: 18 (04-26-21 @ 06:36)  SpO2: 100% (04-26-21 @ 06:36)    In:   04-25-21 @ 07:01  -  04-26-21 @ 07:00  --------------------------------------------------------  IN: 0 mL      Out:   04-25-21 @ 07:01  -  04-26-21 @ 07:00  --------------------------------------------------------  OUT:    Voided (mL): 600 mL  Total OUT: 600 mL        Net:   04-25-21 @ 07:01  -  04-26-21 @ 07:00  --------------------------------------------------------  NET: -600 mL        Diet: Diet, NPO:   Except Medications  With Chewing Gum  With Hard Candy  With Ice Chips/Sips of Water (04-21-21 @ 16:47)    IV Fluids: Parenteral Nutrition - Adult 1 Each TPN Continuous <Continuous>      Physical Examination:  General Appearance: NAD ***  HEENT: EOMI, sclera non-icteric.  Heart: RRR   Lungs: CTABL.   Abdomen:  Soft, ***nontender, nondistended.   MSK/Extremities: Warm & well-perfused.   Skin: Warm, dry. No jaundice.       Medications: [Standing]  chlorhexidine 4% Liquid 1 Application(s) Topical <User Schedule>  heparin   Injectable 5000 Unit(s) SubCutaneous every 8 hours  iohexol 300 mG (iodine)/mL Oral Solution 30 milliLiter(s) Oral once  metoprolol succinate ER 25 milliGRAM(s) Oral daily  pantoprazole  Injectable 40 milliGRAM(s) IV Push two times a day  Parenteral Nutrition - Adult 1 Each TPN Continuous <Continuous>  sucralfate 1 Gram(s) Oral two times a day    DVT Prophylaxis: heparin   Injectable 5000 Unit(s) SubCutaneous every 8 hours    GI Prophylaxis: pantoprazole  Injectable 40 milliGRAM(s) IV Push two times a day    Antibiotics:   Anticoagulation:   Medications:[PRN]  benzocaine 20% Spray 1 Spray(s) Topical four times a day PRN      Labs:                        8.7    3.69  )-----------( 262      ( 25 Apr 2021 08:47 )             27.4     04-25    137  |  105  |  29<H>  ----------------------------<  105<H>  4.7   |  23  |  1.0    Ca    8.2<L>      25 Apr 2021 12:13  Phos  3.7     04-24  Mg     2.0     04-24                  Urine/Micro:        Imaging:   ***

## 2021-04-26 NOTE — DISCHARGE NOTE PROVIDER - NSDCCPCAREPLAN_GEN_ALL_CORE_FT
PRINCIPAL DISCHARGE DIAGNOSIS  Diagnosis: Acute gastric ulcer with perforation  Assessment and Plan of Treatment: continue with home TPN at least until  2 weeks in outpt office by Dr. Rocha - [phone]  may have clear liquids at home. please return to ED if you developing any nausea or vomiting  follow up with Gastroenterology Dr. Palacio      SECONDARY DISCHARGE DIAGNOSES  Diagnosis: Anemia  Assessment and Plan of Treatment:

## 2021-04-26 NOTE — PROGRESS NOTE ADULT - REASON FOR ADMISSION
contained perforated gastric ulcer

## 2021-04-26 NOTE — DISCHARGE NOTE PROVIDER - HOSPITAL COURSE
4/13  81yo M, PMHx GERD, HTN, CAD, HLD, CKD3, CABGx4, presents for abdominal pain. Pt was sent from urgent care after BRBPR was found. Reports right sided intermittent abdominal pain x 1W with recent generalized weakness and increasing DIAZ. Pt denies history of colonoscopy. Most recent hgb per son was 8.9 on 3/29/21, and he was started on iron pills. Reports constipation contributing from the iron pills. Pt inconsistently takes Omeprazole due to concerns regarding renal side effects. He denies hematochieza, hematemesis, diarrhea, nausea, vomiting, CP, fever, and chills.    In the ED, Hb 6.6, WBC 12. CT A/P notable for wall thickening and wall edema of the distal stomach and proximal duodenum with mucosal hyperenhancement and surrounding inflammatory change compatible with inflamed ulcer disease, as well as a focus of air along the lesser curvature slightly distant from the suspected main ulcer possibly reflecting additional ulcer disease, however contained or impending perforation is not excluded and close monitoring is suggested. No gross free air noted. NGT was placed to 50cm. PO contrast given, and repeat CT showed redemonstration of gastric wall thickening, thickening of the duodenal bulb and 2nd portion of the duodenum with associated fat stranding c/w gastritis and duodenitis. PO contrast was visible in the proximal small bowel. Previously noted small gas bubbles are again seen posterior to the posterior wall of the gastric antrum. Findings are consistent with a contained perforated ulcer into the region of the lesser sac with inflammatory changes but without a discrete fluid collection. There is no evidence of active extravasation of the oral contrast material. There is no evidence of pneumoperitoneum or free air elsewhere within the abdomen.     seen by GI who recommended medical management until EGD could be performed safely.    4/15-- cardiology deems moderate risk for EGD  4/16--started PPN, tested positive for COVID  4/17-- seen by ID- no need for abx   4/19--EGD:  Impressions:    Esophagitis compatible with nonspecific erosive esophagitis.    Erythema in the antrum and stomach body. (Biopsy).    The posterior wall of the duodenal bulb was a giant deep ulcer eroding into  what appeared to be the pancreatic head. This deep ulcer is the likely etiology  of the contained perforation described on imaging. Biopsies from the base of the  ulcer were obtained to rule out malignancy. .   Normal mucosa in the second part of the duodenum.     Nutrition evaluation to optimize caloric intake   Await pathology results  Protonix IV while inpatient then DC on Protonix 40 mg po BID    4/22-- got PICC with IR for anticipating TPN at home    Patient stable and awaiting home TPN set up     Vital Signs Last 24 Hrs  T(C): 36.7 (26 Apr 2021 12:25), Max: 36.9 (25 Apr 2021 21:00)  T(F): 98.1 (26 Apr 2021 12:25), Max: 98.5 (25 Apr 2021 21:00)  HR: 64 (26 Apr 2021 12:25) (62 - 68)  BP: 129/75 (26 Apr 2021 12:25) (125/61 - 141/64)  RR: 18 (26 Apr 2021 12:25) (18 - 18)  SpO2: 97% (26 Apr 2021 12:25) (97% - 100%)      Patient given appropriate follow up instructions on discharge paperwork.   F/U  2 weeks in outpt office by Dr. Rocha - [phone]  F/U with Dr. Palacio from GI  F/U with Dr. Domingo from surgery 4/13  81yo M, PMHx GERD, HTN, CAD, HLD, CKD3, CABGx4, presents for abdominal pain. Pt was sent from urgent care after BRBPR was found. Reports right sided intermittent abdominal pain x 1W with recent generalized weakness and increasing DIAZ. Pt denies history of colonoscopy. Most recent hgb per son was 8.9 on 3/29/21, and he was started on iron pills. Reports constipation contributing from the iron pills. Pt inconsistently takes Omeprazole due to concerns regarding renal side effects. He denies hematochieza, hematemesis, diarrhea, nausea, vomiting, CP, fever, and chills.    In the ED, Hb 6.6, WBC 12. CT A/P notable for wall thickening and wall edema of the distal stomach and proximal duodenum with mucosal hyperenhancement and surrounding inflammatory change compatible with inflamed ulcer disease, as well as a focus of air along the lesser curvature slightly distant from the suspected main ulcer possibly reflecting additional ulcer disease, however contained or impending perforation is not excluded and close monitoring is suggested. No gross free air noted. NGT was placed to 50cm. PO contrast given, and repeat CT showed redemonstration of gastric wall thickening, thickening of the duodenal bulb and 2nd portion of the duodenum with associated fat stranding c/w gastritis and duodenitis. PO contrast was visible in the proximal small bowel. Previously noted small gas bubbles are again seen posterior to the posterior wall of the gastric antrum. Findings are consistent with a contained perforated ulcer into the region of the lesser sac with inflammatory changes but without a discrete fluid collection. There is no evidence of active extravasation of the oral contrast material. There is no evidence of pneumoperitoneum or free air elsewhere within the abdomen.     seen by GI who recommended medical management until EGD could be performed safely.    4/15-- cardiology deems moderate risk for EGD  4/16--started PPN, tested positive for COVID  4/17-- seen by ID- no need for abx   4/19--EGD:  Impressions:    Esophagitis compatible with nonspecific erosive esophagitis.    Erythema in the antrum and stomach body. (Biopsy).    The posterior wall of the duodenal bulb was a giant deep ulcer eroding into  what appeared to be the pancreatic head. This deep ulcer is the likely etiology  of the contained perforation described on imaging. Biopsies from the base of the  ulcer were obtained to rule out malignancy. .   Normal mucosa in the second part of the duodenum.     Nutrition evaluation to optimize caloric intake   Await pathology results  Protonix IV while inpatient then DC on Protonix 40 mg po BID    4/22-- got PICC with IR for anticipating TPN at home    Patient stable and awaiting home TPN set up     Vital Signs Last 24 Hrs  T(C): 36.7 (26 Apr 2021 12:25), Max: 36.9 (25 Apr 2021 21:00)  T(F): 98.1 (26 Apr 2021 12:25), Max: 98.5 (25 Apr 2021 21:00)  HR: 64 (26 Apr 2021 12:25) (62 - 68)  BP: 129/75 (26 Apr 2021 12:25) (125/61 - 141/64)  RR: 18 (26 Apr 2021 12:25) (18 - 18)  SpO2: 97% (26 Apr 2021 12:25) (97% - 100%)      Patient given appropriate follow up instructions on discharge paperwork.   F/U  2 weeks in outpt office by Dr. Rocha - [phone]  F/U with Dr. Palacio from GI  F/U with Dr. Domingo from surgery    Discussed with Dr. Rocha and nutrition team that a nurse will be at Mr. Gonzáles's house [facility] for TPN administration and all supplies are ready at his home.

## 2021-04-26 NOTE — PROGRESS NOTE ADULT - NSICDXPILOT_GEN_ALL_CORE
[place]

## 2021-04-26 NOTE — PROGRESS NOTE ADULT - ASSESSMENT
A/P:  KELLY DELGADO is a 80yMale patient being kept over weekend for TPN optimization as per nutrition team. planned for possible DC on monday.    Plan:     -continue nutrition optimization  -vitals  -labs  -OOB  -D/C planning today with home TPN  -Contact social work/case management for necessary patient needs.           Date/Time: 04-26-21 @ 08:06

## 2021-04-26 NOTE — CHART NOTE - NSCHARTNOTESELECT_GEN_ALL_CORE
Nutrition Support/Nutrition Services
family update/Event Note
Anesthesia PACU note
Event Note
Nutrition Support/Nutrition Services
Transfer Note
family contact/Event Note
family update/Event Note

## 2021-04-26 NOTE — DISCHARGE NOTE PROVIDER - CARE PROVIDER_API CALL
Caesar Palacio)  Gastroenterology; Internal Medicine  [address] [place] [address]  Phone: [phone]  Fax: [phone]  Follow Up Time: 2 weeks    Wesley Domingo  SURGERY  [address] [place] [address]  Phone: [phone]  Fax: [phone]  Follow Up Time:     Eboni Rocha  NUTRITIONAL SUPPORT  [address] [place] [address]  Phone: [phone]  Fax: [phone]  Follow Up Time: 2 weeks

## 2021-04-26 NOTE — DISCHARGE NOTE PROVIDER - NSDCMRMEDTOKEN_GEN_ALL_CORE_FT
aspirin 81 mg oral tablet, chewable: 1 tab(s) orally once a day  atorvastatin 80 mg oral tablet: 1 tab(s) orally once a day  iron polysaccharide (as elemental iron) 60 mg oral capsule: 1 tab(s) orally 2 times a day  Lypholyte II/Nutrilyte II/TPN Electrolytes intravenous solution: 1 each intravenous   metoprolol succinate 25 mg oral tablet, extended release: 1 tab(s) orally 2 times a day  pantoprazole 40 mg oral delayed release tablet: 1 tab(s) orally once a day  ranolazine 500 mg oral tablet, extended release: 1 tab(s) orally 2 times a day  Vitamin D3 2000 intl units (50 mcg) oral tablet: 1 tab(s) orally 2 times a day

## 2021-04-26 NOTE — CHART NOTE - NSCHARTNOTEFT_GEN_A_CORE
T(F): 97.3 (04-26-21 @ 06:36), Max: 98.5 (04-25-21 @ 21:00)  HR: 68 (04-26-21 @ 06:36) (62 - 68)  BP: 125/61 (04-26-21 @ 06:36) (125/61 - 141/64)  RR: 18 (04-26-21 @ 06:36) (18 - 18)  SpO2: 100% (04-26-21 @ 08:54) (97% - 100%)    I&O's Detail    25 Apr 2021 07:01  -  26 Apr 2021 07:00  --------------------------------------------------------  IN:  Total IN: 0 mL    OUT:    Voided (mL): 600 mL  Total OUT: 600 mL    Total NET: -600 mL    04-26    136  |  104  |  30<H>  ----------------------------<  106<H>  4.9   |  26  |  1.1    Ca    8.4<L>      26 Apr 2021 08:37  Phos  3.5     04-26  Mg     2.0     04-26                        8.8    3.61  )-----------( 269      ( 26 Apr 2021 08:37 )             27.8     Diet, NPO:   Except Medications  With Chewing Gum  With Hard Candy  With Ice Chips/Sips of Water (04-21-21 @ 16:47)      PLAN  - d/c home today with TPN  - [facility] to provide home care services for home TPN, orders being sent  - on discharge, should be seen in ~ 2 weeks in outpt office by Dr. Rocha - [phone]. Pt doing well, no complaints  Afebrile  NPO 2nd to duodenal perf, EGD done 4/19  TPN cycled over 14hrs last night. PICC in place  d/c home on TPN planned for today with [facility] home care services  d/w pt's son, Narayan, via phone today    T(F): 97.3 (04-26-21 @ 06:36), Max: 98.5 (04-25-21 @ 21:00)  HR: 68 (04-26-21 @ 06:36) (62 - 68)  BP: 125/61 (04-26-21 @ 06:36) (125/61 - 141/64)  RR: 18 (04-26-21 @ 06:36) (18 - 18)  SpO2: 100% (04-26-21 @ 08:54) (97% - 100%)    I&O's Detail    25 Apr 2021 07:01  -  26 Apr 2021 07:00  --------------------------------------------------------  IN:  Total IN: 0 mL    OUT:    Voided (mL): 600 mL  Total OUT: 600 mL    Total NET: -600 mL    04-26    136  |  104  |  30<H>  ----------------------------<  106<H>  4.9   |  26  |  1.1    Ca    8.4<L>      26 Apr 2021 08:37  Phos  3.5     04-26  Mg     2.0     04-26                        8.8    3.61  )-----------( 269      ( 26 Apr 2021 08:37 )             27.8     Diet, NPO:   Except Medications  With Chewing Gum  With Hard Candy  With Ice Chips/Sips of Water (04-21-21 @ 16:47)    PLAN  - d/c home today with TPN  - [facility] to provide home care services for home TPN, orders sent  - on discharge, should be seen in ~ 2 weeks in outpt office by Dr. Rocha - [phone]

## 2021-04-28 RX ORDER — PANTOPRAZOLE SODIUM 20 MG/1
1 TABLET, DELAYED RELEASE ORAL
Qty: 30 | Refills: 0
Start: 2021-04-28 | End: 2021-05-27

## 2021-05-03 DIAGNOSIS — K21.00 GASTRO-ESOPHAGEAL REFLUX DISEASE WITH ESOPHAGITIS, WITHOUT BLEEDING: ICD-10-CM

## 2021-05-03 DIAGNOSIS — U07.1 COVID-19: ICD-10-CM

## 2021-05-03 DIAGNOSIS — I12.9 HYPERTENSIVE CHRONIC KIDNEY DISEASE WITH STAGE 1 THROUGH STAGE 4 CHRONIC KIDNEY DISEASE, OR UNSPECIFIED CHRONIC KIDNEY DISEASE: ICD-10-CM
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
## 2021-05-03 DIAGNOSIS — K26.6 CHRONIC OR UNSPECIFIED DUODENAL ULCER WITH BOTH HEMORRHAGE AND PERFORATION: ICD-10-CM

## 2021-05-03 DIAGNOSIS — I25.10 ATHEROSCLEROTIC HEART DISEASE OF NATIVE CORONARY ARTERY WITHOUT ANGINA PECTORIS: ICD-10-CM

## 2021-05-03 DIAGNOSIS — I27.20 PULMONARY HYPERTENSION, UNSPECIFIED: ICD-10-CM

## 2021-05-03 DIAGNOSIS — Z91.14 PATIENT'S OTHER NONCOMPLIANCE WITH MEDICATION REGIMEN: ICD-10-CM

## 2021-05-03 DIAGNOSIS — R26.89 OTHER ABNORMALITIES OF GAIT AND MOBILITY: ICD-10-CM

## 2021-05-03 DIAGNOSIS — Z95.0 PRESENCE OF CARDIAC PACEMAKER: ICD-10-CM

## 2021-05-03 DIAGNOSIS — I44.0 ATRIOVENTRICULAR BLOCK, FIRST DEGREE: ICD-10-CM

## 2021-05-03 DIAGNOSIS — K87 DISORDERS OF GALLBLADDER, BILIARY TRACT AND PANCREAS IN DISEASES CLASSIFIED ELSEWHERE: ICD-10-CM

## 2021-05-03 DIAGNOSIS — Z95.1 PRESENCE OF AORTOCORONARY BYPASS GRAFT: ICD-10-CM

## 2021-05-03 DIAGNOSIS — T45.4X5A ADVERSE EFFECT OF IRON AND ITS COMPOUNDS, INITIAL ENCOUNTER: ICD-10-CM

## 2021-05-03 DIAGNOSIS — T82.110A BREAKDOWN (MECHANICAL) OF CARDIAC ELECTRODE, INITIAL ENCOUNTER: ICD-10-CM

## 2021-05-03 DIAGNOSIS — J98.11 ATELECTASIS: ICD-10-CM

## 2021-05-03 DIAGNOSIS — R71.0 PRECIPITOUS DROP IN HEMATOCRIT: ICD-10-CM

## 2021-05-03 DIAGNOSIS — D50.0 IRON DEFICIENCY ANEMIA SECONDARY TO BLOOD LOSS (CHRONIC): ICD-10-CM

## 2021-05-03 DIAGNOSIS — N28.9 DISORDER OF KIDNEY AND URETER, UNSPECIFIED: ICD-10-CM

## 2021-05-03 DIAGNOSIS — N18.30 CHRONIC KIDNEY DISEASE, STAGE 3 UNSPECIFIED: ICD-10-CM

## 2021-05-03 DIAGNOSIS — I45.2 BIFASCICULAR BLOCK: ICD-10-CM

## 2021-05-03 DIAGNOSIS — K29.70 GASTRITIS, UNSPECIFIED, WITHOUT BLEEDING: ICD-10-CM

## 2021-05-03 DIAGNOSIS — Z87.891 PERSONAL HISTORY OF NICOTINE DEPENDENCE: ICD-10-CM

## 2021-05-03 DIAGNOSIS — K26.5 CHRONIC OR UNSPECIFIED DUODENAL ULCER WITH PERFORATION: ICD-10-CM

## 2021-05-03 DIAGNOSIS — Z79.82 LONG TERM (CURRENT) USE OF ASPIRIN: ICD-10-CM

## 2021-05-03 DIAGNOSIS — Z86.16 PERSONAL HISTORY OF COVID-19: ICD-10-CM

## 2021-05-03 DIAGNOSIS — K59.03 DRUG INDUCED CONSTIPATION: ICD-10-CM

## 2021-05-03 DIAGNOSIS — R10.84 GENERALIZED ABDOMINAL PAIN: ICD-10-CM
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
## 2023-04-14 NOTE — ED ADULT TRIAGE NOTE - CHIEF COMPLAINT QUOTE
Called Torin to review his recent TAC level which was below goal.     TAC level was 6.4 on 4/13/2023, 13 hour trough. Current goal is 8-12. Current dose is 4 mg BID. Instructed him to increase his dose to 4 mg every AM and 5 mg every PM and we'll get another level next week. RX updated.     Torin had his katarzyna removed today and feels good. No current complaints/concerns and I asked him to call me to check in next week.   
Pt BIBA from urgent care complaining of abdominal pain x 1 week. sent in for blood on rectal exam at urgent care.

## 2023-09-29 NOTE — CONSULT NOTE ADULT - CONSULT REASON
Patient has contained gastric ulcer requiring optimazation for EGD with GI Patient has contained gastric ulcer requiring optimization for EGD with GI yes